# Patient Record
Sex: FEMALE | Race: BLACK OR AFRICAN AMERICAN | NOT HISPANIC OR LATINO | URBAN - METROPOLITAN AREA
[De-identification: names, ages, dates, MRNs, and addresses within clinical notes are randomized per-mention and may not be internally consistent; named-entity substitution may affect disease eponyms.]

---

## 2023-04-18 ENCOUNTER — INPATIENT (INPATIENT)
Facility: HOSPITAL | Age: 43
LOS: 10 days | Discharge: HOME | DRG: 55 | End: 2023-04-29
Attending: NEUROLOGICAL SURGERY | Admitting: NEUROLOGICAL SURGERY
Payer: MEDICARE

## 2023-04-18 VITALS
HEART RATE: 76 BPM | RESPIRATION RATE: 18 BRPM | DIASTOLIC BLOOD PRESSURE: 89 MMHG | SYSTOLIC BLOOD PRESSURE: 129 MMHG | OXYGEN SATURATION: 98 % | TEMPERATURE: 98 F

## 2023-04-18 DIAGNOSIS — M48.20 KISSING SPINE, SITE UNSPECIFIED: ICD-10-CM

## 2023-04-18 LAB
A1C WITH ESTIMATED AVERAGE GLUCOSE RESULT: 5.6 % — SIGNIFICANT CHANGE UP (ref 4–5.6)
ALBUMIN SERPL ELPH-MCNC: 3.7 G/DL — SIGNIFICANT CHANGE UP (ref 3.3–5)
ALP SERPL-CCNC: 44 U/L — SIGNIFICANT CHANGE UP (ref 40–120)
ALT FLD-CCNC: 15 U/L — SIGNIFICANT CHANGE UP (ref 10–45)
ANION GAP SERPL CALC-SCNC: 11 MMOL/L — SIGNIFICANT CHANGE UP (ref 5–17)
AST SERPL-CCNC: 6 U/L — LOW (ref 10–40)
BASOPHILS # BLD AUTO: 0.06 K/UL — SIGNIFICANT CHANGE UP (ref 0–0.2)
BASOPHILS NFR BLD AUTO: 0.5 % — SIGNIFICANT CHANGE UP (ref 0–2)
BILIRUB SERPL-MCNC: 0.2 MG/DL — SIGNIFICANT CHANGE UP (ref 0.2–1.2)
BUN SERPL-MCNC: 12 MG/DL — SIGNIFICANT CHANGE UP (ref 7–23)
CALCIUM SERPL-MCNC: 9.1 MG/DL — SIGNIFICANT CHANGE UP (ref 8.4–10.5)
CHLORIDE SERPL-SCNC: 103 MMOL/L — SIGNIFICANT CHANGE UP (ref 96–108)
CO2 SERPL-SCNC: 27 MMOL/L — SIGNIFICANT CHANGE UP (ref 22–31)
CREAT SERPL-MCNC: 0.53 MG/DL — SIGNIFICANT CHANGE UP (ref 0.5–1.3)
EGFR: 118 ML/MIN/1.73M2 — SIGNIFICANT CHANGE UP
EOSINOPHIL # BLD AUTO: 0.08 K/UL — SIGNIFICANT CHANGE UP (ref 0–0.5)
EOSINOPHIL NFR BLD AUTO: 0.6 % — SIGNIFICANT CHANGE UP (ref 0–6)
ESTIMATED AVERAGE GLUCOSE: 114 MG/DL — SIGNIFICANT CHANGE UP (ref 68–114)
GLUCOSE SERPL-MCNC: 112 MG/DL — HIGH (ref 70–99)
HCG SERPL-ACNC: <2 MIU/ML — SIGNIFICANT CHANGE UP
HCT VFR BLD CALC: 39 % — SIGNIFICANT CHANGE UP (ref 34.5–45)
HGB BLD-MCNC: 12.4 G/DL — SIGNIFICANT CHANGE UP (ref 11.5–15.5)
IMM GRANULOCYTES NFR BLD AUTO: 1.1 % — HIGH (ref 0–0.9)
LYMPHOCYTES # BLD AUTO: 1.97 K/UL — SIGNIFICANT CHANGE UP (ref 1–3.3)
LYMPHOCYTES # BLD AUTO: 15.9 % — SIGNIFICANT CHANGE UP (ref 13–44)
MAGNESIUM SERPL-MCNC: 1.9 MG/DL — SIGNIFICANT CHANGE UP (ref 1.6–2.6)
MCHC RBC-ENTMCNC: 28.7 PG — SIGNIFICANT CHANGE UP (ref 27–34)
MCHC RBC-ENTMCNC: 31.8 GM/DL — LOW (ref 32–36)
MCV RBC AUTO: 90.3 FL — SIGNIFICANT CHANGE UP (ref 80–100)
MONOCYTES # BLD AUTO: 0.63 K/UL — SIGNIFICANT CHANGE UP (ref 0–0.9)
MONOCYTES NFR BLD AUTO: 5.1 % — SIGNIFICANT CHANGE UP (ref 2–14)
NEUTROPHILS # BLD AUTO: 9.52 K/UL — HIGH (ref 1.8–7.4)
NEUTROPHILS NFR BLD AUTO: 76.8 % — SIGNIFICANT CHANGE UP (ref 43–77)
NRBC # BLD: 0 /100 WBCS — SIGNIFICANT CHANGE UP (ref 0–0)
PLATELET # BLD AUTO: 296 K/UL — SIGNIFICANT CHANGE UP (ref 150–400)
POTASSIUM SERPL-MCNC: 3.6 MMOL/L — SIGNIFICANT CHANGE UP (ref 3.5–5.3)
POTASSIUM SERPL-SCNC: 3.6 MMOL/L — SIGNIFICANT CHANGE UP (ref 3.5–5.3)
PROT SERPL-MCNC: 6.5 G/DL — SIGNIFICANT CHANGE UP (ref 6–8.3)
RBC # BLD: 4.32 M/UL — SIGNIFICANT CHANGE UP (ref 3.8–5.2)
RBC # FLD: 13.5 % — SIGNIFICANT CHANGE UP (ref 10.3–14.5)
SODIUM SERPL-SCNC: 141 MMOL/L — SIGNIFICANT CHANGE UP (ref 135–145)
T4 AB SER-ACNC: 6.4 UG/DL — SIGNIFICANT CHANGE UP (ref 4.6–12)
WBC # BLD: 12.4 K/UL — HIGH (ref 3.8–10.5)
WBC # FLD AUTO: 12.4 K/UL — HIGH (ref 3.8–10.5)

## 2023-04-18 PROCEDURE — 93970 EXTREMITY STUDY: CPT | Mod: 26

## 2023-04-18 RX ORDER — PANTOPRAZOLE SODIUM 20 MG/1
40 TABLET, DELAYED RELEASE ORAL
Refills: 0 | Status: DISCONTINUED | OUTPATIENT
Start: 2023-04-18 | End: 2023-04-29

## 2023-04-18 RX ORDER — ACETAMINOPHEN 500 MG
650 TABLET ORAL EVERY 6 HOURS
Refills: 0 | Status: DISCONTINUED | OUTPATIENT
Start: 2023-04-18 | End: 2023-04-29

## 2023-04-18 RX ORDER — NIFEDIPINE 30 MG
60 TABLET, EXTENDED RELEASE 24 HR ORAL DAILY
Refills: 0 | Status: DISCONTINUED | OUTPATIENT
Start: 2023-04-18 | End: 2023-04-26

## 2023-04-18 RX ORDER — OXYCODONE HYDROCHLORIDE 5 MG/1
10 TABLET ORAL EVERY 4 HOURS
Refills: 0 | Status: DISCONTINUED | OUTPATIENT
Start: 2023-04-18 | End: 2023-04-18

## 2023-04-18 RX ORDER — ALBUTEROL 90 UG/1
2 AEROSOL, METERED ORAL
Refills: 0 | DISCHARGE

## 2023-04-18 RX ORDER — POLYETHYLENE GLYCOL 3350 17 G/17G
17 POWDER, FOR SOLUTION ORAL DAILY
Refills: 0 | Status: DISCONTINUED | OUTPATIENT
Start: 2023-04-18 | End: 2023-04-20

## 2023-04-18 RX ORDER — SENNA PLUS 8.6 MG/1
2 TABLET ORAL AT BEDTIME
Refills: 0 | Status: DISCONTINUED | OUTPATIENT
Start: 2023-04-18 | End: 2023-04-29

## 2023-04-18 RX ORDER — ONDANSETRON 8 MG/1
4 TABLET, FILM COATED ORAL EVERY 6 HOURS
Refills: 0 | Status: DISCONTINUED | OUTPATIENT
Start: 2023-04-18 | End: 2023-04-20

## 2023-04-18 RX ORDER — OXYCODONE HYDROCHLORIDE 5 MG/1
5 TABLET ORAL EVERY 4 HOURS
Refills: 0 | Status: DISCONTINUED | OUTPATIENT
Start: 2023-04-18 | End: 2023-04-24

## 2023-04-18 RX ORDER — ALBUTEROL 90 UG/1
2 AEROSOL, METERED ORAL EVERY 6 HOURS
Refills: 0 | Status: DISCONTINUED | OUTPATIENT
Start: 2023-04-18 | End: 2023-04-29

## 2023-04-18 RX ORDER — METHOCARBAMOL 500 MG/1
500 TABLET, FILM COATED ORAL EVERY 8 HOURS
Refills: 0 | Status: DISCONTINUED | OUTPATIENT
Start: 2023-04-18 | End: 2023-04-29

## 2023-04-18 RX ORDER — NIFEDIPINE 30 MG
1 TABLET, EXTENDED RELEASE 24 HR ORAL
Refills: 0 | DISCHARGE

## 2023-04-18 RX ADMIN — SENNA PLUS 2 TABLET(S): 8.6 TABLET ORAL at 21:13

## 2023-04-18 RX ADMIN — Medication 650 MILLIGRAM(S): at 21:13

## 2023-04-18 RX ADMIN — Medication 650 MILLIGRAM(S): at 22:13

## 2023-04-18 NOTE — H&P ADULT - HISTORY OF PRESENT ILLNESS
42F Hx HTN, obesity s/p gastric sleeve, hysterectomy, xfer from [facility] for T11 lesion. OSH path report c/w leimyoma. MR showing svr stenosis @ level of T11 w/enhancing lesion with a high fat content. CT CAP was corrupted.  42F Hx HTN, obesity s/p gastric sleeve, hysterectomy, LBP since 08/2022 w/progressive BLE radic, 02/2023 the patient began having difficulty ambulating 2/2 pain, was started on steroids and sent to rehab->worsening pain, re-scanned w/new T11 lesion s/p biopsy  xfer from [facility] for T11 lesion. OSH path report c/w leimyoma. MR showing svr stenosis @ level of T11 w/enhancing lesion with a high fat content. CT CAP was corrupted.

## 2023-04-18 NOTE — H&P ADULT - NSHPPHYSICALEXAM_GEN_ALL_CORE
AOx3, BUE 5s throughout, BLE 5s throughout, neg rowan/neg clonus b/l, else normoreflexive, paresthesias to feet going up to ankles

## 2023-04-18 NOTE — H&P ADULT - ASSESSMENT
42F Hx HTN, obesity s/p gastric sleeve, hysterectomy, xfer from [facility] for T11 lesion. OSH path report c/w leimyoma. MR showing svr stenosis @ level of T11 w/enhancing lesion with a high fat content. CT CAP was corrupted.   Exam: Intact outside of BLE stocking paresthesias  -CT CAP w/iv con, CT C/T/L spine reformat with thin cuts  -obtain pathology slides  -Hospitalist to comanage/clear for eventual OR 42F Hx HTN, obesity s/p gastric sleeve, hysterectomy, LBP since 08/2022 w/progressive BLE radic, 02/2023 the patient began having difficulty ambulating 2/2 pain, was started on steroids and sent to rehab->worsening pain, re-scanned w/new T11 lesion s/p biopsy  xfer from [facility] for T11 lesion. OSH path report c/w leimyoma. MR showing svr stenosis @ level of T11 w/enhancing lesion with a high fat content. CT CAP was corrupted.   Exam: Intact outside of BLE stocking paresthesias  -CT CAP w/iv con, CT C/T/L spine reformat with thin cuts  -obtain pathology slides  -Hospitalist to comanage/clear for eventual OR

## 2023-04-19 DIAGNOSIS — D49.2 NEOPLASM OF UNSPECIFIED BEHAVIOR OF BONE, SOFT TISSUE, AND SKIN: ICD-10-CM

## 2023-04-19 DIAGNOSIS — Z29.9 ENCOUNTER FOR PROPHYLACTIC MEASURES, UNSPECIFIED: ICD-10-CM

## 2023-04-19 DIAGNOSIS — I10 ESSENTIAL (PRIMARY) HYPERTENSION: ICD-10-CM

## 2023-04-19 DIAGNOSIS — Z01.818 ENCOUNTER FOR OTHER PREPROCEDURAL EXAMINATION: ICD-10-CM

## 2023-04-19 LAB
APTT BLD: 29 SEC — SIGNIFICANT CHANGE UP (ref 27.5–35.5)
BLD GP AB SCN SERPL QL: NEGATIVE — SIGNIFICANT CHANGE UP
INR BLD: 0.97 RATIO — SIGNIFICANT CHANGE UP (ref 0.88–1.16)
MRSA PCR RESULT.: SIGNIFICANT CHANGE UP
PHOSPHATE SERPL-MCNC: 5.7 MG/DL — HIGH (ref 2.5–4.5)
PROTHROM AB SERPL-ACNC: 11.2 SEC — SIGNIFICANT CHANGE UP (ref 10.5–13.4)
RH IG SCN BLD-IMP: POSITIVE — SIGNIFICANT CHANGE UP
S AUREUS DNA NOSE QL NAA+PROBE: SIGNIFICANT CHANGE UP

## 2023-04-19 PROCEDURE — 72128 CT CHEST SPINE W/O DYE: CPT | Mod: 26

## 2023-04-19 PROCEDURE — 72131 CT LUMBAR SPINE W/O DYE: CPT | Mod: 26

## 2023-04-19 PROCEDURE — 93010 ELECTROCARDIOGRAM REPORT: CPT | Mod: 76

## 2023-04-19 PROCEDURE — 74177 CT ABD & PELVIS W/CONTRAST: CPT | Mod: 26

## 2023-04-19 PROCEDURE — 99232 SBSQ HOSP IP/OBS MODERATE 35: CPT

## 2023-04-19 PROCEDURE — 72125 CT NECK SPINE W/O DYE: CPT | Mod: 26

## 2023-04-19 PROCEDURE — 71260 CT THORAX DX C+: CPT | Mod: 26

## 2023-04-19 PROCEDURE — 99223 1ST HOSP IP/OBS HIGH 75: CPT

## 2023-04-19 RX ORDER — ENOXAPARIN SODIUM 100 MG/ML
40 INJECTION SUBCUTANEOUS
Refills: 0 | Status: DISCONTINUED | OUTPATIENT
Start: 2023-04-19 | End: 2023-04-29

## 2023-04-19 RX ADMIN — Medication 5 MILLIGRAM(S): at 21:47

## 2023-04-19 RX ADMIN — SENNA PLUS 2 TABLET(S): 8.6 TABLET ORAL at 21:47

## 2023-04-19 RX ADMIN — Medication 650 MILLIGRAM(S): at 05:13

## 2023-04-19 RX ADMIN — PANTOPRAZOLE SODIUM 40 MILLIGRAM(S): 20 TABLET, DELAYED RELEASE ORAL at 05:12

## 2023-04-19 RX ADMIN — Medication 1 TABLET(S): at 12:06

## 2023-04-19 RX ADMIN — Medication 60 MILLIGRAM(S): at 05:12

## 2023-04-19 RX ADMIN — Medication 650 MILLIGRAM(S): at 06:13

## 2023-04-19 RX ADMIN — ENOXAPARIN SODIUM 40 MILLIGRAM(S): 100 INJECTION SUBCUTANEOUS at 17:32

## 2023-04-19 NOTE — CONSULT NOTE ADULT - SUBJECTIVE AND OBJECTIVE BOX
NEUROSURGERY - [facility] MEDICINE CO-MANAGEMENT INITIAL VISIT NOTE    CHIEF COMPLAINT: Patient is a 42y old  Female who presents with a chief complaint of T11 bony tumor (19 Apr 2023 13:46)      HPI: 42F Hx HTN, obesity s/p gastric sleeve, hysterectomy, LBP since 08/2022 w/progressive BLE radic, 02/2023 the patient began having difficulty ambulating 2/2 pain, was started on steroids and sent to rehab->worsening pain, re-scanned w/new T11 lesion s/p biopsy  xfer from [facility] for T11 lesion. OSH path report c/w leimyoma. MR showing svr stenosis @ level of T11 w/enhancing lesion with a high fat content.     Metastatic work up with wall thickening concern for esophageal mass, right hepatic lobe lesion measures 1.4 cm &  Bilateral adrenal nodules measuring 1.8 cm on the left and 1.1 cm on the right.     Pt currently in bed, c/o LE weakness, denies pain.       Allergies    No Known Allergies      Home Medications:  Albuterol (Eqv-Proventil HFA) 90 mcg/inh inhalation aerosol: 2 puff(s) inhaled every 6 hours as needed for  shortness of breath and/or wheezing (18 Apr 2023 17:38)  NIFEdipine 60 mg oral tablet, extended release: 1 tab(s) orally once a day (18 Apr 2023 17:39)      MEDICATIONS  (STANDING):  enoxaparin Injectable 40 milliGRAM(s) SubCutaneous <User Schedule>  multivitamin 1 Tablet(s) Oral daily  NIFEdipine XL 60 milliGRAM(s) Oral daily  pantoprazole    Tablet 40 milliGRAM(s) Oral before breakfast  polyethylene glycol 3350 17 Gram(s) Oral daily  senna 2 Tablet(s) Oral at bedtime    MEDICATIONS  (PRN):  acetaminophen     Tablet .. 650 milliGRAM(s) Oral every 6 hours PRN Temp greater or equal to 38C (100.4F), Mild Pain (1 - 3)  albuterol    90 MICROgram(s) HFA Inhaler 2 Puff(s) Inhalation every 6 hours PRN for shortness of breath and/or wheezing  bisacodyl 5 milliGRAM(s) Oral daily PRN Constipation  methocarbamol 500 milliGRAM(s) Oral every 8 hours PRN Muscle Spasm  ondansetron Injectable 4 milliGRAM(s) IV Push every 6 hours PRN Nausea and/or Vomiting  oxyCODONE    IR 5 milliGRAM(s) Oral every 4 hours PRN Moderate Pain (4 - 6)  oxyCODONE    IR 10 milliGRAM(s) Oral every 4 hours PRN Severe Pain (7 - 10)      PAST MEDICAL & SURGICAL HISTORY:  [ x] Reviewed- recently diagnosed htn    Functional Assessment: [ ] Independent  [x ] Assistance  [ ] Total care  [ ] Non-ambulatory    SOCIAL HISTORY:  Residence: [ ] custodial  [ ] SNF  [ x] Community  [x ] Substance abuse: None    FAMILY HISTORY:  [ x] No pertinent family history in first degree relatives of spinal mass    REVIEW OF SYSTEMS:    CONSTITUTIONAL: No fever, weight loss, or fatigue  EYES: No eye pain, visual disturbances, or discharge  ENMT:  No difficulty hearing, tinnitus, vertigo; No sinus or throat pain  NECK: No pain or stiffness  BREASTS: No pain, masses, or nipple discharge  RESPIRATORY: No cough, wheezing, chills or hemoptysis; No shortness of breath  CARDIOVASCULAR: No chest pain, palpitations, dizziness, or leg swelling  GASTROINTESTINAL: No abdominal or epigastric pain. No nausea, vomiting, or hematemesis; No diarrhea or constipation. No melena or hematochezia.  GENITOURINARY: No dysuria, frequency, hematuria, or incontinence  NEUROLOGICAL: No headaches, memory loss. LE weakness  SKIN: No itching, burning, rashes, or lesions   LYMPH NODES: No enlarged glands  ENDOCRINE: No heat or cold intolerance; No hair loss  MUSCULOSKELETAL: Back pain  PSYCHIATRIC: No depression, anxiety, mood swings, or difficulty sleeping  HEME/LYMPH: No easy bruising, or bleeding gums  ALLERGY AND IMMUNOLOGIC: No hives or eczema    [ x ] All other ROS negative  [  ] Unable to obtain due to poor mental status    PHYSICAL EXAM:    Vital Signs Last 24 Hrs  T(C): 36.6 (19 Apr 2023 13:28), Max: 36.6 (18 Apr 2023 21:14)  T(F): 97.9 (19 Apr 2023 13:28), Max: 97.9 (19 Apr 2023 13:28)  HR: 72 (19 Apr 2023 13:28) (72 - 84)  BP: 106/74 (19 Apr 2023 13:28) (106/74 - 131/81)  BP(mean): --  RR: 18 (19 Apr 2023 13:28) (18 - 18)  SpO2: 98% (19 Apr 2023 13:28) (98% - 100%)    Parameters below as of 19 Apr 2023 13:28  Patient On (Oxygen Delivery Method): room air        CONSTITUTIONAL: Well-groomed, in no apparent distress  EYES: No conjunctival or scleral injection, non-icteric; PERRLA and symmetric  ENMT: No external nasal lesions; nasal mucosa not inflamed; normal dentition; no pharyngeal injection or exudates, oral mucosa with moist membranes  NECK: Trachea midline without palpable neck mass; thyroid not enlarged and non-tender  RESPIRATORY: Breathing comfortably; no dullness to percussion; lungs CTA without wheeze/rhonchi/rales  CARDIOVASCULAR: +S1S2, RRR, no M/G/R; no carotid bruits; pedal pulses full and symmetric; no lower extremity edema  CHEST/BREAST: Breasts are symmetric in appearance; no palpable masses or lumps  GASTROINTESTINAL: No palpable masses or tenderness, +BS throughout, no rebound/guarding; no hepatosplenomegaly; no hernia palpated  LYMPHATIC: No cervical LAD or tenderness; no axillary LAD or tenderness; no inguinal LAD or tenderness  MUSCULOSKELETAL: No digital clubbing or cyanosis; no paraspinal tenderness; examination of the (head/neck, spine/ribs/pelvis, RUE, LUE, RLE, LLE) without misalignment  SKIN: No rashes or ulcers noted; no subcutaneous nodules or induration palpable  NEUROLOGIC: LE weakness- 2-3/5  PSYCHIATRIC: A+O x 3; mood and affect appropriate; appropriate insight and judgment    LABS:                        12.4   12.40 )-----------( 296      ( 18 Apr 2023 15:43 )             39.0     Hemoglobin: 12.4 g/dL (04-18 @ 15:43)    04-18    141  |  103  |  12  ----------------------------<  112<H>  3.6   |  27  |  0.53    Ca    9.1      18 Apr 2023 15:42  Phos  5.7     04-19  Mg     1.9     04-18    TPro  6.5  /  Alb  3.7  /  TBili  0.2  /  DBili  x   /  AST  6<L>  /  ALT  15  /  AlkPhos  44  04-18    PT/INR - ( 19 Apr 2023 06:19 )   PT: 11.2 sec;   INR: 0.97 ratio         PTT - ( 19 Apr 2023 06:19 )  PTT:29.0 sec    CAPILLARY BLOOD GLUCOSE            RADIOLOGY & ADDITIONAL STUDIES:    EKG:   Personally Reviewed:  [ ] YES     Imaging:   Personally Reviewed:  [ ] YES               [ ] Consultant(s) Notes Reviewed  [x] Care Discussed with Consultants/Other Providers: Neurosurgery Team    [ x] Fall risks identified:      [ x] Increased delirium risk    [x ] Delirium and other risks can be reduced by:          -early ambulation          -minimizing "tethers" - IV, oxygen, catheters, etc          -avoiding hypnotics and sedatives          -maintaining hydration/nutrition          -avoid anticholinergics - diphenhydramine, etc          -pain control          -supportive environment    Advanced Directives: [ ] DNR  [ ] No feeding tube  [ ] MOLST in chart  [ ] MOLST completed today  [x ] Unknown

## 2023-04-19 NOTE — CONSULT NOTE ADULT - ASSESSMENT
42F Hx HTN, obesity s/p gastric sleeve, hysterectomy, LBP since 08/2022 w/progressive BLE radic, 02/2023 the patient began having difficulty ambulating 2/2 pain, was started on steroids and sent to rehab->worsening pain, re-scanned w/new T11 lesion s/p biopsy  xfer from [facility] for T11 lesion. OSH path report c/w leimyoma. MR showing svr stenosis @ level of T11 w/enhancing lesion with a high fat content.    Metastatic work up with wall thickening concern for esophageal mass, right hepatic lobe lesion measures 1.4 cm &  Bilateral adrenal nodules measuring 1.8 cm on the left and 1.1 cm on the right.     Hx Gastric sleeve 3 years ago - underwent pre-op EGD (reported negative per pt)  +reflux/dyspepsia - Rx with PPI by outpt GI/surgeon (Jayna) per pt report    #mid-esophageal wall thickening ?mass ?stricture ?esophagitis  -Reviewed with pt indications, risks, benefits of EGD to evaluate CT findings and offered EGD for endoscopic evaluation and biopsy. She is refusing any GI procedure and further biopsies.  -Clinically tolerating PO, can continue. Monitor PO tolerance  -continue PPI (known GERD and s/p gastric sleeve anatomy, recent steroids)  -emotional support given; consider psych input    Further management per primary team  Discussed with Neurosurgery    Bulmaro Bragg PA-C    [facility]  [phone]  Available on TEAMS Mon-Fri 8a-4p  After hours and weekend coverage [phone]

## 2023-04-19 NOTE — CONSULT NOTE ADULT - PROBLEM SELECTOR RECOMMENDATION 2
Potential plan for surgery. Pt has low cardiac risk predictors and is medically optimized.     Obtain baseline EKG.

## 2023-04-19 NOTE — CONSULT NOTE ADULT - ASSESSMENT
42F Hx HTN, obesity s/p gastric sleeve, hysterectomy, LBP since 08/2022 w/progressive BLE radic, 02/2023 the patient began having difficulty ambulating 2/2 pain, was started on steroids and sent to rehab->worsening pain, re-scanned w/new T11 lesion s/p biopsy- xfer from [facility] for T11 lesion. OSH path report c/w leimyoma. MR showing svr stenosis @ level of T11 w/enhancing lesion with a high fat content.     Metastatic work up with wall thickening concern for esophageal mass, right hepatic lobe lesion measures 1.4 cm &  Bilateral adrenal nodules measuring 1.8 cm on the left and 1.1 cm on the right.     Pt currently in bed, c/o LE weakness, denies pain.

## 2023-04-19 NOTE — CONSULT NOTE ADULT - PROBLEM SELECTOR RECOMMENDATION 3
Per pt, no prior history. Was recently diagnosed during [facility] admission.     Continue Procardia XL.

## 2023-04-19 NOTE — PROGRESS NOTE ADULT - ASSESSMENT
42F Hx HTN, obesity s/p gastric sleeve, hysterectomy, LBP since 08/2022 w/progressive BLE radic, 02/2023 the patient began having difficulty ambulating 2/2 pain, was started on steroids and sent to rehab->worsening pain, re-scanned w/new T11 lesion s/p biopsy  xfer from [facility] for T11 lesion. OSH path report c/w leimyoma. MR showing svr stenosis @ level of T11 w/enhancing lesion with a high fat content. Metastatic work up with wall thickening concern for esophageal mass, right hepatic lobe lesion measures 1.4 cm &  Bilateral adrenal nodules measuring 1.8 cm on the left and 1.1 cm on the right    Plan     Neuro stable. Slides T11 lesion biopsy from [facility] being transferred   Vitals stable  Medicine consulted for clearance  GI consulted for possible esophageal mass   DVT ppx   D/w Dr Anaya

## 2023-04-19 NOTE — PROGRESS NOTE ADULT - SUBJECTIVE AND OBJECTIVE BOX
SUBJECTIVE:   c/o pins & needles b/l bottom of feet . No bowel or bladder issues   OVERNIGHT EVENTS: none    Vital Signs Last 24 Hrs  T(C): 36.6 (2023 13:28), Max: 36.8 (2023 15:48)  T(F): 97.9 (2023 13:28), Max: 98.2 (2023 15:48)  HR: 72 (:28) (72 - 87)  BP: 106/74 (:28) (106/74 - 131/81)  BP(mean): --  RR: 18 (2023 13:28) (18 - 18)  SpO2: 98% (:28) (98% - 100%)    Parameters below as of 2023 13:  Patient On (Oxygen Delivery Method): room air        PHYSICAL EXAM:    Constitutional: No Acute Distress     Neurological: Awake alert Ox3, Speech clear Following Commands, Moving all Extremities 5/5 B/L LE below knee decreased sensation light touch .     Pulmonary: Clear to Auscultation,    Cardiovascular: S1, S2, Regular rate and rhythm     Gastrointestinal: Soft, Non-tender, Non-distended     Extremities: No calf tenderness       LABS:                        12.4   12.40 )-----------( 296      ( 2023 15:43 )             39.0    04-18    141  |  103  |  12  ----------------------------<  112<H>  3.6   |  27  |  0.53    Ca    9.1      2023 15:42  Phos  5.7     -  Mg     1.9     -18    TPro  6.5  /  Alb  3.7  /  TBili  0.2  /  DBili  x   /  AST  6<L>  /  ALT  15  /  AlkPhos  44  04-18  PT/INR - ( 2023 06:19 )   PT: 11.2 sec;   INR: 0.97 ratio   PTT:29.0 sec          IMAGIN/19 Ct T/L spine- Markedly coarsened osseous texture of the entire T11 vertebral body that extends into the bilateral pedicles. There is soft tissue filling the left neural foramen at T11/T12 and to a lesser degree within the left T10/T11 neural foramen     Wall thickening of the mid esophagus, concerning for underlying mass. Consider further evaluation with endoscopy.  * Indeterminate lesions in the right hepatic lobe and bilateral adrenal glands, which may be further evaluated with contrast-enhanced abdominal MRI.  * Subcentimeter nodules in the left lung.  * Lytic lesion in the T11 vertebral body    MEDICATIONS:    acetaminophen     Tablet .. 650 milliGRAM(s) Oral every 6 hours PRN Temp greater or equal to 38C (100.4F), Mild Pain (1 - 3)  methocarbamol 500 milliGRAM(s) Oral every 8 hours PRN Muscle Spasm  ondansetron Injectable 4 milliGRAM(s) IV Push every 6 hours PRN Nausea and/or Vomiting  oxyCODONE    IR 5 milliGRAM(s) Oral every 4 hours PRN Moderate Pain (4 - 6)  oxyCODONE    IR 10 milliGRAM(s) Oral every 4 hours PRN Severe Pain (7 - 10)  NIFEdipine XL 60 milliGRAM(s) Oral daily  albuterol    90 MICROgram(s) HFA Inhaler 2 Puff(s) Inhalation every 6 hours PRN for shortness of breath and/or wheezing  bisacodyl 5 milliGRAM(s) Oral daily PRN Constipation  pantoprazole    Tablet 40 milliGRAM(s) Oral before breakfast  polyethylene glycol 3350 17 Gram(s) Oral daily  senna 2 Tablet(s) Oral at bedtime  enoxaparin Injectable 40 milliGRAM(s) SubCutaneous <User Schedule>  multivitamin 1 Tablet(s) Oral daily      DIET:

## 2023-04-19 NOTE — CONSULT NOTE ADULT - PROBLEM SELECTOR RECOMMENDATION 4
Lovenox for DVT prophylaxis.       Has no PMD. Lovenox for DVT prophylaxis.       H/o asthma- no current exbn. Prn Albuterol.     Has no PMD.

## 2023-04-19 NOTE — CONSULT NOTE ADULT - SUBJECTIVE AND OBJECTIVE BOX
Patient is a 42y old  Female who presents with a chief complaint of T11 bony tumor (19 Apr 2023 16:06)      HPI:  42F Hx HTN, obesity s/p gastric sleeve, hysterectomy, LBP since 08/2022 w/progressive BLE radic, 02/2023 the patient began having difficulty ambulating 2/2 pain, was started on steroids and sent to rehab->worsening pain, re-scanned w/new T11 lesion s/p biopsy  xfer from [facility] for T11 lesion. OSH path report c/w leimyoma. MR showing svr stenosis @ level of T11 w/enhancing lesion with a high fat content.    c/o lower extremity weakness  no back pain    recent hospitalizations and rehab stays 2/2 LBP.Initial onset of back pain Summer 2022; Rx with analgesics (narcotics, NSAIDs and muscle relaxants). Developed weakness and inability to walk several months ago; treated with steroids. s/p multiple scans and biopsies at [facility] - then transferred to [facility] for further neurosurgical management.   Metastatic work up with wall thickening concern for esophageal mass, right hepatic lobe lesion measures 1.4 cm &  Bilateral adrenal nodules measuring 1.8 cm on the left and 1.1 cm on the right.       Hx Gastric sleeve 3 years ago - underwent pre-op EGD (reported negative per pt)  +reflux/dyspepsia - Rx with PPI by outpt GI/surgeon (Jayna)  BM today - brown formed stool  no abdominal pain, nausea or vomiting  reports sensation of odynophagia "only when eating too fast"; otherwise tolerating PO diet without issues  no regurgitation or emesis    pt very tearful and expressing frustration about having to undergo multiple biopsies and procedures.       PAST MEDICAL & SURGICAL HISTORY:  hx fibroids; s/p myomectomy, s/p emergent hysterectomy 2/2 bleeding  s/p BTL  s/p gastric sleeve 3 years ago ([facility])  HTN  Obesity  LBP, sciatica    Allergies  No Known Allergies      MEDICATIONS  (STANDING):  enoxaparin Injectable 40 milliGRAM(s) SubCutaneous <User Schedule>  multivitamin 1 Tablet(s) Oral daily  NIFEdipine XL 60 milliGRAM(s) Oral daily  pantoprazole    Tablet 40 milliGRAM(s) Oral before breakfast  polyethylene glycol 3350 17 Gram(s) Oral daily  senna 2 Tablet(s) Oral at bedtime    MEDICATIONS  (PRN):  acetaminophen     Tablet .. 650 milliGRAM(s) Oral every 6 hours PRN Temp greater or equal to 38C (100.4F), Mild Pain (1 - 3)  albuterol    90 MICROgram(s) HFA Inhaler 2 Puff(s) Inhalation every 6 hours PRN for shortness of breath and/or wheezing  bisacodyl 5 milliGRAM(s) Oral daily PRN Constipation  methocarbamol 500 milliGRAM(s) Oral every 8 hours PRN Muscle Spasm  ondansetron Injectable 4 milliGRAM(s) IV Push every 6 hours PRN Nausea and/or Vomiting  oxyCODONE    IR 5 milliGRAM(s) Oral every 4 hours PRN Moderate Pain (4 - 6)  oxyCODONE    IR 10 milliGRAM(s) Oral every 4 hours PRN Severe Pain (7 - 10)      Social History:    no tobacco  2 children ages 13, 14  small business owner (restaurant)    Family History   IBD (  ) Yes   ( X ) No  GI Malignancy (  )  Yes    ( X ) No      Advanced Directives: (   X  ) None    (      ) DNR    (     ) DNI    (     ) Health Care Proxy:     Review of Systems:  see HPI- remainder 10 point ROS negative      Vital Signs Last 24 Hrs  T(C): 36.9 (19 Apr 2023 15:52), Max: 36.9 (19 Apr 2023 15:52)  T(F): 98.4 (19 Apr 2023 15:52), Max: 98.4 (19 Apr 2023 15:52)  HR: 88 (19 Apr 2023 15:52) (72 - 88)  BP: 112/77 (19 Apr 2023 15:52) (106/74 - 131/81)  BP(mean): --  RR: 18 (19 Apr 2023 15:52) (18 - 18)  SpO2: 98% (19 Apr 2023 15:52) (98% - 100%)    Parameters below as of 19 Apr 2023 15:52  Patient On (Oxygen Delivery Method): room air    PHYSICAL EXAM:    Constitutional: NAD, well-developed non toxic appearing.  intermittently tearful.   Neck: No LAD, supple no JVD  Mouth: no thrush no lesions  Respiratory: bl air entry, no accessory muscle use  Cardiovascular: S1 and S2, RRR  Gastrointestinal: BS+, soft, obese soft ND NT  WH surgical scars   Extremities: No peripheral edema, neg clubing, cyanosis  Vascular: 2+ peripheral pulses  Neurological: A/O x 3, no focal asymmetry  b/l LE weakness 3/5  Psychiatric: tearful, good eye contact. expressing fear and frustration  Skin: No rashes    LABS:                        12.4   12.40 )-----------( 296      ( 18 Apr 2023 15:43 )             39.0     04-18    141  |  103  |  12  ----------------------------<  112<H>  3.6   |  27  |  0.53    Ca    9.1      18 Apr 2023 15:42  Phos  5.7     04-19  Mg     1.9     04-18    TPro  6.5  /  Alb  3.7  /  TBili  0.2  /  DBili  x   /  AST  6<L>  /  ALT  15  /  AlkPhos  44  04-18    PT/INR - ( 19 Apr 2023 06:19 )   PT: 11.2 sec;   INR: 0.97 ratio         PTT - ( 19 Apr 2023 06:19 )  PTT:29.0 sec        RADIOLOGY & ADDITIONAL TESTS:    ACC: [id number] EXAM:  CT CHEST IC   ORDERED BY:  EDUAR FITZGERALD     ACC: [id number] EXAM:  CT ABDOMEN AND PELVIS IC   ORDERED BY:  EDUAR FITZGERALD     PROCEDURE DATE:  04/19/2023          INTERPRETATION:  CLINICAL INFORMATION: Evaluate for metastatic disease.   T11 bone tumor.    COMPARISON: None.    CONTRAST/COMPLICATIONS:  IV Contrast: Omnipaque 350  90 cc administered   10 cc discarded  Oral Contrast: NONE  Complications: None reported at time of study completion    PROCEDURE:  CT of the Chest, Abdomen and Pelvis was performed.  Sagittal and coronal reformats were performed.    FINDINGS:  CHEST:  LUNGS AND LARGE AIRWAYS: Patent central airways. Nonspecific groundglass   nodules measuring 5 mm in the left upper lobe and 4 mm in the left lower   lobe (3:65 and 82)  PLEURA: No pleural effusion.  VESSELS: Within normal limits.  HEART: Heart size is normal. No pericardial effusion.  MEDIASTINUM AND MERVAT: Fluid distends the proximal esophagus. Wall   thickening of the midesophagus, raising a question of underlying mass.   Small hiatal hernia.  CHEST WALL AND LOWER NECK: Within normal limits.    ABDOMEN AND PELVIS:  LIVER: Indeterminate lesion in the right hepatic lobe measures 1.4 cm.  BILE DUCTS: Normal caliber.  GALLBLADDER: Sludge and stones.  SPLEEN: Within normal limits.  PANCREAS: Within normal limits.  ADRENALS: Bilateral adrenal nodules measuring 1.8 cm on the left and 1.1   cm on the right.  KIDNEYS/URETERS: Within normal limits.    BLADDER: Within normal limits.  REPRODUCTIVE ORGANS: Hysterectomy.    BOWEL: Sleeve gastrectomy. No bowel obstruction. Appendix is normal.  PERITONEUM: No ascites.  VESSELS: Within normal limits.  RETROPERITONEUM/LYMPH NODES: No lymphadenopathy.  ABDOMINAL WALL: Within normal limits.  BONES: Lytic lesion in the T11 vertebral body, in keeping with the   clinical history.    IMPRESSION:  *  Wall thickening of the mid esophagus, concerning for underlying mass.   Consider further evaluation with endoscopy.  *  Indeterminate lesions in the right hepatic lobe and bilateral adrenal   glands, which may be further evaluated with contrast-enhanced abdominal   MRI.  *  Subcentimeter nodules in the left lung.  *  Lytic lesion in the T11 vertebral body corresponding with lesion   described by clinical history.      ACC: [id number] EXAM:  CT THORACIC SPINE   ORDERED BY:  EDUAR FITZGERALD   ACC: [id number] EXAM:  CT LUMBAR SPINE   ORDERED BY:  EDUAR FITZGERALD   ACC: [id number] EXAM:  CT CERVICAL SPINE   ORDERED BY:  EDUAR FITZGERALD     PROCEDURE DATE:  04/19/2023      INTERPRETATION:  Exam Date: 4/19/2023 11:49 AM    CT cervical, thoracic and lumbar spine without IV contrast    CLINICAL INFORMATION: Staging for possible metastatic disease. Preop   planning.    TECHNIQUE:  Contiguous axial  sections were obtained through the   cervical, thoracic and lumbar spine using a single helical acquisition.     Coronal and sagital reformats were obtained.    FINDINGS:   No prior similar studies are available for review    Cervical, thoracic, lumbar spine are reported together.    There is markedly coarsened osseous texture of the entire T11 vertebral   body that extends into the bilateral pedicles. There is soft tissue   filling the left neural foramen at T11/T12 and to a lesser degree within   the left T10/T11 neuralforamen, unknown if this is part of the same bony   process versus disc herniations, limited by CT technique. No associated   fracture.    At L5/S1, there is disc herniation within the left neural foramen   narrowing the left neural foramen. No significant right foraminal or   central spinal canal stenosis.    Rest of the cervical, thoracic, lumbar intervertebral discs appear   unremarkable, however evaluation of the spinal canal is very limited at   C6 level through T1 secondary to patient body habitus.    No paraspinal mass is recognized.  Paraspinal soft tissues appear intact.      IMPRESSION:    Markedly coarsened osseous texture of the entire T11 vertebral body that   extends into the bilateral pedicles. There is soft tissue filling the   left neural foramen at T11/T12 and to a lesser degree within the left   T10/T11 neural foramen, unknown if this is part of the same bony process   versus disc herniations, limited by CT technique. MRI may be helpful for   further evaluation as clinically indicated.    No additional suspicious lesions of the cervical, thoracic, lumbar spine.

## 2023-04-19 NOTE — PATIENT PROFILE ADULT - FALL HARM RISK - HARM RISK INTERVENTIONS

## 2023-04-20 DIAGNOSIS — M89.9 DISORDER OF BONE, UNSPECIFIED: ICD-10-CM

## 2023-04-20 PROCEDURE — 99231 SBSQ HOSP IP/OBS SF/LOW 25: CPT

## 2023-04-20 PROCEDURE — 99233 SBSQ HOSP IP/OBS HIGH 50: CPT | Mod: GC

## 2023-04-20 PROCEDURE — 99232 SBSQ HOSP IP/OBS MODERATE 35: CPT

## 2023-04-20 PROCEDURE — 99221 1ST HOSP IP/OBS SF/LOW 40: CPT | Mod: 57

## 2023-04-20 RX ORDER — POLYETHYLENE GLYCOL 3350 17 G/17G
17 POWDER, FOR SOLUTION ORAL DAILY
Refills: 0 | Status: DISCONTINUED | OUTPATIENT
Start: 2023-04-20 | End: 2023-04-26

## 2023-04-20 RX ADMIN — Medication 1 TABLET(S): at 11:47

## 2023-04-20 RX ADMIN — Medication 650 MILLIGRAM(S): at 21:50

## 2023-04-20 RX ADMIN — Medication 60 MILLIGRAM(S): at 05:38

## 2023-04-20 RX ADMIN — SENNA PLUS 2 TABLET(S): 8.6 TABLET ORAL at 21:51

## 2023-04-20 RX ADMIN — Medication 650 MILLIGRAM(S): at 22:20

## 2023-04-20 RX ADMIN — PANTOPRAZOLE SODIUM 40 MILLIGRAM(S): 20 TABLET, DELAYED RELEASE ORAL at 05:38

## 2023-04-20 RX ADMIN — METHOCARBAMOL 500 MILLIGRAM(S): 500 TABLET, FILM COATED ORAL at 21:52

## 2023-04-20 RX ADMIN — Medication 650 MILLIGRAM(S): at 16:15

## 2023-04-20 RX ADMIN — POLYETHYLENE GLYCOL 3350 17 GRAM(S): 17 POWDER, FOR SOLUTION ORAL at 11:47

## 2023-04-20 RX ADMIN — Medication 650 MILLIGRAM(S): at 17:15

## 2023-04-20 NOTE — CONSULT NOTE ADULT - ATTENDING COMMENTS
42F Hx HTN, obesity s/p gastric sleeve, hysterectomy, LBP since 08/2022 w/progressive BLE radic, 02/2023 the patient began having difficulty ambulating 2/2 pain, was started on steroids and sent to rehab->worsening pain, re-scanned w/new T11 lesion s/p biopsy transferred from [facility] for T11 lesion.    #Thoracic Vertebral Lesion  Patient has had progressively worsening lower back pain since August 2022. Initially presented as lower back pain but developed bilateral lower extremity radiculopathy and difficulty ambulating. She was recently found to have a T11 vertebral lesion that was biopsied at [facility]. That biopsy reportedly showed leiomyoma.  - Slides have been requested from [facility] to be reviewed by a pathologist here.  - CT chest/abd/pelvis 4/19/23 shows wall thickening of the mid esophagus, concerning for underlying mass. Indeterminate lesions in the right hepatic lobe and bilateral adrenal glands, which may be further evaluated with contrast-enhanced abdominal MRI. Subcentimeter nodules in the left lung. Lytic lesion in the T11 vertebral body corresponding with lesion described by clinical history.  - Would prefer that patient have EGD with biopsy of esophageal thickening noted, but patient declining at this time as she has already had two biopsies and we are still waiting for the slides to be delivered and reviewed. Those two biopsies were of the primary site, so still would need to know if there are any metastatic lesions.  - Neurosurgery planning for spinal procedure and will obtain tissue  - Recommend MRI abdomen w/ IV contrast to further evaluate the liver  - Outpatient oncologist Dr. Marito Akbar had been contacted about her case. Will continue to keep Dr. Akbar updated regarding patient's hospital course

## 2023-04-20 NOTE — PHYSICAL THERAPY INITIAL EVALUATION ADULT - ADDITIONAL COMMENTS
as per pt: PTA pt was living in a PH + Stairs lives in [place] on 2nd floor walk up/ and was independent in all functional mobility and ADL's. RW for gait since last rehab stay. however pt states she is going to stay with sisters home upon DC at her home in [facility] 1st floor set up and family can assist her as needed.

## 2023-04-20 NOTE — PROGRESS NOTE ADULT - PROBLEM SELECTOR PLAN 1
Pt w morbid obesity with a BMI ~45, multiple prior fibroids requiring extensive gynecological procedures culminating in hysterectomy, p/w increasing LBP with radiation down legs and difficulty ambulating. Started last summer in 8/2022. Increased in 2/2023 requiring hospital admission. Work-up revealed an intraosseous mass at T11 with epidural extension and high-grade cord compression. Reportedly also had some lesions in the lumbar spine as well. Lumbar spine lesion biopsied, c/w leiomyoma. T11 lesion also biopsied as well which was also leiomyoma. Was offered surgery but transferred over to NS due to concern about possible underlying malignancy.     MRI T-spine w/wo shows T11 lesion that appears to have significant epidural extension with cord compression. Metastatic workup as above. Pt does not want an EGD this time for evaluation of the esophageal findings.     Awaiting slides from [facility] for surgical planning. Per Onc- Recommend MRI abdomen w/ IV contrast to further evaluate the liver Pt w morbid obesity with a BMI ~45, multiple prior fibroids requiring extensive gynecological procedures culminating in hysterectomy, p/w increasing LBP with radiation down legs and difficulty ambulating. Started last summer in 8/2022. Increased in 2/2023 requiring hospital admission. Work-up revealed an intraosseous mass at T11 with epidural extension and high-grade cord compression.     Reportedly also had some lesions in the lumbar spine as well. Lumbar spine lesion biopsied, c/w leiomyoma. T11 lesion also biopsied as well which was also leiomyoma. Was offered surgery but transferred over to NS due to concern about possible underlying malignancy.     MRI T-spine w/wo shows T11 lesion that appears to have significant epidural extension with cord compression. Metastatic workup as above. Pt does not want an EGD this time for evaluation of the esophageal findings.     Awaiting slides from [facility] for surgical planning. Per Onc- Recommend MRI abdomen w/ IV contrast to further evaluate the liver

## 2023-04-20 NOTE — PHYSICAL THERAPY INITIAL EVALUATION ADULT - ASR WT BEARING STATUS EVAL
Implemented All Universal Safety Interventions:  [place] to call system. Call bell, personal items and telephone within reach. Instruct patient to call for assistance. Room bathroom lighting operational. Non-slip footwear when patient is off stretcher. Physically safe environment: no spills, clutter or unnecessary equipment. Stretcher in lowest position, wheels locked, appropriate side rails in place. WBAT

## 2023-04-20 NOTE — CONSULT NOTE ADULT - SUBJECTIVE AND OBJECTIVE BOX
Oncology Consult Note    HPI as per admitting team:   42F Hx HTN, obesity s/p gastric sleeve, hysterectomy, LBP since 08/2022 w/progressive BLE radic, 02/2023 the patient began having difficulty ambulating 2/2 pain, was started on steroids and sent to rehab->worsening pain, re-scanned w/new T11 lesion s/p biopsy  xfer from [facility] for T11 lesion. OSH path report c/w leimyoma. MR showing svr stenosis @ level of T11 w/enhancing lesion with a high fat content. CT CAP was corrupted.  (18 Apr 2023 17:32)        REVIEW OF SYSTEMS:    CONSTITUTIONAL: No weakness, fevers or chills  EYES/ENT: No visual changes;  No vertigo or throat pain   NECK: No pain or stiffness  RESPIRATORY: No cough, wheezing, hemoptysis; No shortness of breath  CARDIOVASCULAR: No chest pain or palpitations  GASTROINTESTINAL: No abdominal or epigastric pain. No nausea, vomiting, or hematemesis; No diarrhea or constipation. No melena or hematochezia.  GENITOURINARY: No dysuria, frequency or hematuria  NEUROLOGICAL: No numbness or weakness  SKIN: No itching, burning, rashes, or lesions   All other review of systems is negative unless indicated above.    PAST MEDICAL & SURGICAL HISTORY:      FAMILY HISTORY:      SOCIAL HISTORY:     Allergies    No Known Allergies    Intolerances        MEDICATIONS  (STANDING):  enoxaparin Injectable 40 milliGRAM(s) SubCutaneous <User Schedule>  multivitamin 1 Tablet(s) Oral daily  NIFEdipine XL 60 milliGRAM(s) Oral daily  pantoprazole    Tablet 40 milliGRAM(s) Oral before breakfast  polyethylene glycol 3350 17 Gram(s) Oral daily  senna 2 Tablet(s) Oral at bedtime    MEDICATIONS  (PRN):  acetaminophen     Tablet .. 650 milliGRAM(s) Oral every 6 hours PRN Temp greater or equal to 38C (100.4F), Mild Pain (1 - 3)  albuterol    90 MICROgram(s) HFA Inhaler 2 Puff(s) Inhalation every 6 hours PRN for shortness of breath and/or wheezing  bisacodyl 5 milliGRAM(s) Oral daily PRN Constipation  methocarbamol 500 milliGRAM(s) Oral every 8 hours PRN Muscle Spasm  ondansetron Injectable 4 milliGRAM(s) IV Push every 6 hours PRN Nausea and/or Vomiting  oxyCODONE    IR 5 milliGRAM(s) Oral every 4 hours PRN Moderate Pain (4 - 6)  oxyCODONE    IR 10 milliGRAM(s) Oral every 4 hours PRN Severe Pain (7 - 10)      OBJECTIVE       T(F): 98.4 (04-20-23 @ 08:15), Max: 98.6 (04-19-23 @ 20:41)  HR: 88 (04-20-23 @ 08:15)  BP: 123/87 (04-20-23 @ 08:15)  RR: 17 (04-20-23 @ 08:15)  SpO2: 99% (04-20-23 @ 08:15)  Wt(kg): --    PHYSICAL EXAM   GENERAL: NAD, well-developed  HEAD:  Atraumatic, Normocephalic  EYES: EOMI, PERRLA, conjunctiva and sclera clear  NECK: Supple, No JVD  CHEST/LUNG: Clear to auscultation bilaterally; No wheeze  HEART: Regular rate and rhythm; No murmurs, rubs, or gallops  ABDOMEN: Soft, Nontender, Nondistended; Bowel sounds present  EXTREMITIES:  2+ Peripheral Pulses, No clubbing, cyanosis, or edema  NEUROLOGY: non-focal  SKIN: No rashes or lesions                          12.4   12.40 )-----------( 296      ( 18 Apr 2023 15:43 )             39.0       04-18    141  |  103  |  12  ----------------------------<  112<H>  3.6   |  27  |  0.53    Ca    9.1      18 Apr 2023 15:42  Phos  5.7     04-19  Mg     1.9     04-18    TPro  6.5  /  Alb  3.7  /  TBili  0.2  /  DBili  x   /  AST  6<L>  /  ALT  15  /  AlkPhos  44  04-18           Oncology Consult Note    HPI as per admitting team:   42F Hx HTN, obesity s/p gastric sleeve, hysterectomy, LBP since 08/2022 w/progressive BLE radic, 02/2023 the patient began having difficulty ambulating 2/2 pain, was started on steroids and sent to rehab->worsening pain, re-scanned w/new T11 lesion s/p biopsy  xfer from [facility] for T11 lesion. OSH path report c/w leimyoma. MR showing svr stenosis @ level of T11 w/enhancing lesion with a high fat content. CT CAP was corrupted.  (18 Apr 2023 17:32)      REVIEW OF SYSTEMS:  CONSTITUTIONAL: No weakness, fevers or chills  EYES/ENT: No visual changes;  No vertigo or throat pain   RESPIRATORY: No cough, wheezing, hemoptysis; No shortness of breath  CARDIOVASCULAR: No chest pain or palpitations  GASTROINTESTINAL: +Acid reflux. No abdominal or epigastric pain. No nausea, vomiting, or hematemesis; No diarrhea or constipation. No melena or hematochezia.  GENITOURINARY: No dysuria, frequency or hematuria  NEUROLOGICAL: +Weakness of the right leg  SKIN: No itching, burning, rashes, or lesions   All other review of systems is negative unless indicated above.    PAST MEDICAL & SURGICAL HISTORY:  HTN  Obesity s/p gastric sleeve    s/p hysterectomy    FAMILY HISTORY:  No pertinent family history of cancer.    SOCIAL HISTORY:   Never smoked tobacco. No EtOH or illicit drug use. Lives at home with family, including two children    Allergies  No Known Allergies      MEDICATIONS  (STANDING):  enoxaparin Injectable 40 milliGRAM(s) SubCutaneous <User Schedule>  multivitamin 1 Tablet(s) Oral daily  NIFEdipine XL 60 milliGRAM(s) Oral daily  pantoprazole    Tablet 40 milliGRAM(s) Oral before breakfast  polyethylene glycol 3350 17 Gram(s) Oral daily  senna 2 Tablet(s) Oral at bedtime    MEDICATIONS  (PRN):  acetaminophen     Tablet .. 650 milliGRAM(s) Oral every 6 hours PRN Temp greater or equal to 38C (100.4F), Mild Pain (1 - 3)  albuterol    90 MICROgram(s) HFA Inhaler 2 Puff(s) Inhalation every 6 hours PRN for shortness of breath and/or wheezing  bisacodyl 5 milliGRAM(s) Oral daily PRN Constipation  methocarbamol 500 milliGRAM(s) Oral every 8 hours PRN Muscle Spasm  ondansetron Injectable 4 milliGRAM(s) IV Push every 6 hours PRN Nausea and/or Vomiting  oxyCODONE    IR 5 milliGRAM(s) Oral every 4 hours PRN Moderate Pain (4 - 6)  oxyCODONE    IR 10 milliGRAM(s) Oral every 4 hours PRN Severe Pain (7 - 10)      OBJECTIVE       T(F): 98.4 (04-20-23 @ 08:15), Max: 98.6 (04-19-23 @ 20:41)  HR: 88 (04-20-23 @ 08:15)  BP: 123/87 (04-20-23 @ 08:15)  RR: 17 (04-20-23 @ 08:15)  SpO2: 99% (04-20-23 @ 08:15)  Wt(kg): --    PHYSICAL EXAM   GENERAL: NAD, well-developed  HEAD:  Atraumatic, Normocephalic  EYES: EOMI, PERRLA, conjunctiva and sclera clear  CHEST/LUNG: Clear to auscultation bilaterally; No wheeze  HEART: Regular rate and rhythm; No murmurs, rubs, or gallops  ABDOMEN: Soft, Nontender, Nondistended; Bowel sounds present  EXTREMITIES:  2+ Peripheral Pulses, No clubbing, cyanosis, or edema  NEUROLOGY: 3/5 strength RLE, 4+/5 strength LLE  SKIN: No rashes or lesions                          12.4   12.40 )-----------( 296      ( 18 Apr 2023 15:43 )             39.0       04-18    141  |  103  |  12  ----------------------------<  112<H>  3.6   |  27  |  0.53    Ca    9.1      18 Apr 2023 15:42  Phos  5.7     04-19  Mg     1.9     04-18    TPro  6.5  /  Alb  3.7  /  TBili  0.2  /  DBili  x   /  AST  6<L>  /  ALT  15  /  AlkPhos  44  04-18

## 2023-04-20 NOTE — PROGRESS NOTE ADULT - SUBJECTIVE AND OBJECTIVE BOX
Patient is a 42y old  Female who presents with a chief complaint of T11 bony tumor (20 Apr 2023 09:21)      SUBJECTIVE / OVERNIGHT EVENTS: Pt in bed, states feels overwhelmed. Denies pain.     MEDICATIONS  (STANDING):  enoxaparin Injectable 40 milliGRAM(s) SubCutaneous <User Schedule>  multivitamin 1 Tablet(s) Oral daily  NIFEdipine XL 60 milliGRAM(s) Oral daily  pantoprazole    Tablet 40 milliGRAM(s) Oral before breakfast  senna 2 Tablet(s) Oral at bedtime    MEDICATIONS  (PRN):  acetaminophen     Tablet .. 650 milliGRAM(s) Oral every 6 hours PRN Temp greater or equal to 38C (100.4F), Mild Pain (1 - 3)  albuterol    90 MICROgram(s) HFA Inhaler 2 Puff(s) Inhalation every 6 hours PRN for shortness of breath and/or wheezing  bisacodyl 5 milliGRAM(s) Oral daily PRN Constipation  methocarbamol 500 milliGRAM(s) Oral every 8 hours PRN Muscle Spasm  oxyCODONE    IR 5 milliGRAM(s) Oral every 4 hours PRN Moderate Pain (4 - 6)  oxyCODONE    IR 10 milliGRAM(s) Oral every 4 hours PRN Severe Pain (7 - 10)  polyethylene glycol 3350 17 Gram(s) Oral daily PRN Constipation      CAPILLARY BLOOD GLUCOSE        I&O's Summary    19 Apr 2023 07:01  -  20 Apr 2023 07:00  --------------------------------------------------------  IN: 1160 mL / OUT: 0 mL / NET: 1160 mL        PHYSICAL EXAM:  T(C): 36.8 (04-20-23 @ 12:48), Max: 37 (04-19-23 @ 20:41)  HR: 82 (04-20-23 @ 12:48) (79 - 88)  BP: 109/78 (04-20-23 @ 12:48) (109/78 - 138/86)  RR: 18 (04-20-23 @ 12:48) (17 - 18)  SpO2: 99% (04-20-23 @ 12:48) (97% - 100%)  CONSTITUTIONAL: NAD, well-developed, well-groomed  EYES: PERRLA; conjunctiva and sclera clear  ENMT: Moist oral mucosa, no pharyngeal injection or exudates; normal dentition  NECK: Supple, no palpable masses; no thyromegaly  RESPIRATORY: Normal respiratory effort; lungs are clear to auscultation bilaterally  CARDIOVASCULAR: Regular rate and rhythm, normal S1 and S2, no murmur/rub/gallop; No lower extremity edema; Peripheral pulses are 2+ bilaterally  ABDOMEN: Nontender to palpation, normoactive bowel sounds, no rebound/guarding; No hepatosplenomegaly  MUSCULOSKELETAL: No clubbing or cyanosis of digits; no joint swelling or tenderness to palpation  PSYCH: A+O to person, place, and time; affect appropriate  NEUROLOGY: B/l LE weakness  SKIN: No rashes; no palpable lesions    LABS:                        12.4   12.40 )-----------( 296      ( 18 Apr 2023 15:43 )             39.0     04-18    141  |  103  |  12  ----------------------------<  112<H>  3.6   |  27  |  0.53    Ca    9.1      18 Apr 2023 15:42  Phos  5.7     04-19  Mg     1.9     04-18    TPro  6.5  /  Alb  3.7  /  TBili  0.2  /  DBili  x   /  AST  6<L>  /  ALT  15  /  AlkPhos  44  04-18    PT/INR - ( 19 Apr 2023 06:19 )   PT: 11.2 sec;   INR: 0.97 ratio         PTT - ( 19 Apr 2023 06:19 )  PTT:29.0 sec          RADIOLOGY & ADDITIONAL TESTS:    Imaging Personally Reviewed:    Consultant(s) Notes Reviewed:      Care Discussed with Consultants/Other Providers: Nsx

## 2023-04-20 NOTE — PROGRESS NOTE ADULT - SUBJECTIVE AND OBJECTIVE BOX
Patient seen and examined. In brief, 42-yo F with morbid obesity with a BMI ~45, multiple prior fibroids requiring extensive gynecological procedures culminating in hysterectomy, p/w increasing LBP with radiation down legs and difficulty ambulating. Started last summer in 8/2022. Increased in 2/2023 requiring hospital admission. Work-up revealed an intraosseous mass at T11 with epidural extension and high-grade cord compression. Reportedly also had some lesions in the lumbar spine as well. Lumbar spine lesion biopsied, c/w leiomyoma. T11 lesion also biopsied as well which was also leiomyoma. Was offered surgery but transferred over to us due to concern about possible underlying malignancy. Endorses pain radiating down the legs with subjective leg weakness, able to ambulate with walker. No bowel/bladder incontinence.    On exam, BLE 4+/5, somewhat pain-limited. Numbness from the ankles downwards to light touch. Reflexes 1+. No clonus.    MRI T-spine w/wo shows T11 lesion that appears to have significant epidural extension with cord compression.    Unclear if this represents benign metastasizing leimyoma vs. malignancy. Will pursue systemic workup including repeat staging scans, obtain pathology slides and re-review with our bone pathology team, consult heme onc and go from there.    I spent over 30 min at bedside evaluating the patient and coordinating her care.

## 2023-04-20 NOTE — PROGRESS NOTE ADULT - ASSESSMENT
42F Hx HTN, obesity s/p gastric sleeve, hysterectomy, LBP since 08/2022 w/progressive BLE radic, 02/2023 the patient began having difficulty ambulating 2/2 pain, was started on steroids and sent to rehab->worsening pain, re-scanned w/new T11 lesion s/p biopsy- xfer from [facility] for T11 lesion. OSH path report c/w leimyoma. MR showing svr stenosis @ level of T11 w/enhancing lesion with a high fat content.     Metastatic work up with wall thickening concern for esophageal mass, right hepatic lobe lesion measures 1.4 cm &  Bilateral adrenal nodules measuring 1.8 cm on the left and 1.1 cm on the right.     Pt currently in bed, c/o LE weakness, denies pain.          42F Hx HTN, morbid obesity s/p gastric sleeve, hysterectomy, LBP since 08/2022 w/progressive BLE radic, 02/2023 the patient began having difficulty ambulating 2/2 pain, was started on steroids and sent to rehab->worsening pain, re-scanned w/new T11 lesion s/p biopsy- xfer from [facility] for T11 lesion. OSH path report c/w leimyoma. MR showing svr stenosis @ level of T11 w/enhancing lesion with a high fat content.     Metastatic work up with wall thickening concern for esophageal mass, right hepatic lobe lesion measures 1.4 cm &  Bilateral adrenal nodules measuring 1.8 cm on the left and 1.1 cm on the right.     Pt currently in bed, c/o LE weakness, denies pain.

## 2023-04-20 NOTE — PHYSICAL THERAPY INITIAL EVALUATION ADULT - PERTINENT HX OF CURRENT PROBLEM, REHAB EVAL
42F Hx HTN, obesity s/p gastric sleeve, hysterectomy, LBP since 08/2022 w/progressive BLE radic, 02/2023 the patient began having difficulty ambulating 2/2 pain, was started on steroids and sent to rehab->worsening pain, re-scanned w/new T11 lesion s/p biopsy  tramsfer from [facility] for T11 lesion.   OSH path report c/w leimyoma. MR showing svr stenosis @ level of T11 w/enhancing lesion with a high fat content. Metastatic work up with wall thickening concern for esophageal mass, right hepatic lobe lesion measures 1.4 cm &  Bilateral adrenal nodules measuring 1.8 cm on the left and 1.1 cm on the right

## 2023-04-20 NOTE — CONSULT NOTE ADULT - ASSESSMENT
42F Hx HTN, obesity s/p gastric sleeve, hysterectomy, LBP since 08/2022 w/progressive BLE radic, 02/2023 the patient began having difficulty ambulating 2/2 pain, was started on steroids and sent to rehab->worsening pain, re-scanned w/new T11 lesion s/p biopsy transferred from [facility] for T11 lesion.    #Thoracic Vertebral Lesion  Patient has had progressively worsening lower back pain since August 2022. Initially presented as lower back pain but developed bilateral lower extremity radiculopathy and difficulty ambulating. She was recently found to have a T11 vertebral lesion that was biopsied at [facility]. That biopsy reportedly showed leiomyoma 42F Hx HTN, obesity s/p gastric sleeve, hysterectomy, LBP since 08/2022 w/progressive BLE radic, 02/2023 the patient began having difficulty ambulating 2/2 pain, was started on steroids and sent to rehab->worsening pain, re-scanned w/new T11 lesion s/p biopsy transferred from [facility] for T11 lesion.    #Thoracic Vertebral Lesion  Patient has had progressively worsening lower back pain since August 2022. Initially presented as lower back pain but developed bilateral lower extremity radiculopathy and difficulty ambulating. She was recently found to have a T11 vertebral lesion that was biopsied at [facility]. That biopsy reportedly showed leiomyoma.  - Slides have been requested from [facility] to be reviewed by a pathologist here.  - CT chest/abd/pelvis 4/19/23 shows wall thickening of the mid esophagus, concerning for underlying mass. Indeterminate lesions in the right hepatic lobe and bilateral adrenal glands, which may be further evaluated with contrast-enhanced abdominal MRI. Subcentimeter nodules in the left lung. Lytic lesion in the T11 vertebral body corresponding with lesion described by clinical history.  - Would prefer that patient have EGD with biopsy of esophageal thickening noted, but patient declining at this time as she has already had two biopsies and we are still waiting for the slides to be delivered and reviewed. Those two biopsies were of the primary site, so still would need to know if there are any metastatic lesions.  - Neurosurgery planning for spinal procedure and will obtain tissue  - Outpatient oncologist Dr. Marito Akbar had been contacted about her case. Will continue to keep Dr. Akbar updated regarding patient's hospital course.    Note is not finalized until signed by attending.     Rehan Desir MD  Hematology/Oncology Fellow PGY-4  Pager: [facility] [phone] / [place] [phone]  After 5pm and on weekends please page on-call fellow  42F Hx HTN, obesity s/p gastric sleeve, hysterectomy, LBP since 08/2022 w/progressive BLE radic, 02/2023 the patient began having difficulty ambulating 2/2 pain, was started on steroids and sent to rehab->worsening pain, re-scanned w/new T11 lesion s/p biopsy transferred from [facility] for T11 lesion.    #Thoracic Vertebral Lesion  Patient has had progressively worsening lower back pain since August 2022. Initially presented as lower back pain but developed bilateral lower extremity radiculopathy and difficulty ambulating. She was recently found to have a T11 vertebral lesion that was biopsied at [facility]. That biopsy reportedly showed leiomyoma.  - Slides have been requested from [facility] to be reviewed by a pathologist here.  - CT chest/abd/pelvis 4/19/23 shows wall thickening of the mid esophagus, concerning for underlying mass. Indeterminate lesions in the right hepatic lobe and bilateral adrenal glands, which may be further evaluated with contrast-enhanced abdominal MRI. Subcentimeter nodules in the left lung. Lytic lesion in the T11 vertebral body corresponding with lesion described by clinical history.  - Would prefer that patient have EGD with biopsy of esophageal thickening noted, but patient declining at this time as she has already had two biopsies and we are still waiting for the slides to be delivered and reviewed. Those two biopsies were of the primary site, so still would need to know if there are any metastatic lesions.  - Neurosurgery planning for spinal procedure and will obtain tissue  - Recommend MRI abdomen w/ IV contrast to further evaluate the liver  - Outpatient oncologist Dr. Marito Akbar had been contacted about her case. Will continue to keep Dr. Akbar updated regarding patient's hospital course.    Note is not finalized until signed by attending.     Rehan Desir MD  Hematology/Oncology Fellow PGY-4  Pager: [facility] [phone] / [place] [phone]  After 5pm and on weekends please page on-call fellow

## 2023-04-20 NOTE — PROGRESS NOTE ADULT - ASSESSMENT
42F Hx HTN, obesity s/p gastric sleeve, hysterectomy, LBP since 08/2022 w/progressive BLE radic, 02/2023 the patient began having difficulty ambulating 2/2 pain, was started on steroids and sent to rehab->worsening pain, re-scanned w/new T11 lesion s/p biopsy  xfer from [facility] for T11 lesion. OSH path report c/w leimyoma. MR showing svr stenosis @ level of T11 w/enhancing lesion with a high fat content. Metastatic work up with wall thickening concern for esophageal mass, right hepatic lobe lesion measures 1.4 cm &  Bilateral adrenal nodules measuring 1.8 cm on the left and 1.1 cm on the right    Plan     Neuro stable.  Awaiting Slides T11 lesion biopsy from [facility] . Please call Tara at [facility]  [phone] for tracking number tomorrow   Vitals stable  Medicine consulted for clearance  GI consulted for possible esophageal mass . Pt not interested for EGD. MR abdomen to characterize liver/ adrenal lesions   Oncology consult appreciated.   DVT ppx   PT eval

## 2023-04-21 PROCEDURE — 74182 MRI ABDOMEN W/CONTRAST: CPT | Mod: 26

## 2023-04-21 PROCEDURE — 99232 SBSQ HOSP IP/OBS MODERATE 35: CPT

## 2023-04-21 RX ADMIN — SENNA PLUS 2 TABLET(S): 8.6 TABLET ORAL at 21:15

## 2023-04-21 RX ADMIN — Medication 1 TABLET(S): at 11:12

## 2023-04-21 RX ADMIN — Medication 60 MILLIGRAM(S): at 06:13

## 2023-04-21 RX ADMIN — Medication 650 MILLIGRAM(S): at 06:13

## 2023-04-21 RX ADMIN — PANTOPRAZOLE SODIUM 40 MILLIGRAM(S): 20 TABLET, DELAYED RELEASE ORAL at 06:13

## 2023-04-21 RX ADMIN — Medication 650 MILLIGRAM(S): at 22:01

## 2023-04-21 RX ADMIN — ENOXAPARIN SODIUM 40 MILLIGRAM(S): 100 INJECTION SUBCUTANEOUS at 18:08

## 2023-04-21 RX ADMIN — Medication 650 MILLIGRAM(S): at 06:43

## 2023-04-21 RX ADMIN — Medication 650 MILLIGRAM(S): at 22:31

## 2023-04-21 NOTE — PROGRESS NOTE ADULT - SUBJECTIVE AND OBJECTIVE BOX
SUBJECTIVE: HPI:  42F Hx HTN, obesity s/p gastric sleeve, hysterectomy, LBP since 08/2022 w/progressive BLE radic, 02/2023 the patient began having difficulty ambulating 2/2 pain, was started on steroids and sent to rehab->worsening pain, re-scanned w/new T11 lesion s/p biopsy  xfer from [facility] for T11 lesion. OSH path report c/w leimyoma. MR showing svr stenosis @ level of T11 w/enhancing lesion with a high fat content. CT CAP was corrupted.  (18 Apr 2023 17:32)      OVERNIGHT EVENTS: No acute events overnight, patient seen and evaluated with no acute complaints. Spoke to her about her MRI Abdomen for this evening and told her starting 2pm she must be NPO for the imaging for which she understood. She had a biopsy done at [facility] for the T11 lesion, called Pathology and they reports that it has been mailed today, will follow.     Vital Signs Last 24 Hrs  T(C): 36.6 (21 Apr 2023 12:12), Max: 37.7 (20 Apr 2023 15:55)  T(F): 97.9 (21 Apr 2023 12:12), Max: 99.8 (20 Apr 2023 15:55)  HR: 78 (21 Apr 2023 12:12) (68 - 95)  BP: 105/74 (21 Apr 2023 12:12) (105/74 - 139/74)  BP(mean): --  RR: 18 (21 Apr 2023 12:12) (18 - 18)  SpO2: 100% (21 Apr 2023 12:12) (98% - 100%)    Parameters below as of 21 Apr 2023 12:12  Patient On (Oxygen Delivery Method): room air        DRAINS: None    PHYSICAL EXAM:    Constitutional: No Acute Distress     Neurological: AOx3, Following Commands, Moving all Extremities     Motor exam:          Upper extremity                         Delt     Bicep     Tricep    HG                                                 R         5/5        5/5        5/5       5/5                                               L          5/5        5/5        5/5       5/5          Lower extremity                        HF         KF        KE       DF         PF                                                  R        5/5        5/5        5/5       5/5         5/5                                               L         5/5        5/5       5/5       5/5          5/5                                                 Sensation: [] intact to light touch  [x] decreased: reports decreased sensation from below the knee b/l    Pulmonary: Clear to Auscultation, No rales, No rhonchi, No wheezes     Cardiovascular: S1, S2, Regular rate and rhythm     Gastrointestinal: Soft, Non-tender, Non-distended     Extremities: No calf tenderness     Incision: None    LABS:           MEDICATIONS:  Antibiotics:    Neuro:  acetaminophen     Tablet .. 650 milliGRAM(s) Oral every 6 hours PRN Temp greater or equal to 38C (100.4F), Mild Pain (1 - 3)  methocarbamol 500 milliGRAM(s) Oral every 8 hours PRN Muscle Spasm  oxyCODONE    IR 5 milliGRAM(s) Oral every 4 hours PRN Moderate Pain (4 - 6)  oxyCODONE    IR 10 milliGRAM(s) Oral every 4 hours PRN Severe Pain (7 - 10)    Cardiac:  NIFEdipine XL 60 milliGRAM(s) Oral daily    Pulm:  albuterol    90 MICROgram(s) HFA Inhaler 2 Puff(s) Inhalation every 6 hours PRN for shortness of breath and/or wheezing    GI/:  bisacodyl 5 milliGRAM(s) Oral daily PRN Constipation  pantoprazole    Tablet 40 milliGRAM(s) Oral before breakfast  polyethylene glycol 3350 17 Gram(s) Oral daily PRN Constipation  senna 2 Tablet(s) Oral at bedtime    Other:   enoxaparin Injectable 40 milliGRAM(s) SubCutaneous <User Schedule>  multivitamin 1 Tablet(s) Oral daily    DIET: [x - NPO at 2pm for MRI Abdomen at 6pm] Regular [] CCD [] Renal [] Puree [] Dysphagia [] Tube Feeds:     IMAGING:   < from: CT Chest w/ IV Cont (04.19.23 @ 11:51) >  IMPRESSION:  *  Wall thickening of the mid esophagus, concerning for underlying mass.   Consider further evaluation with endoscopy.  *  Indeterminate lesions in the right hepatic lobe and bilateral adrenal   glands, which may be further evaluated with contrast-enhanced abdominal   MRI.  *  Subcentimeter nodules in the left lung.  *  Lytic lesion in the T11 vertebral body corresponding with lesion   described by clinical history.        --- End of Report ---      ENIO EVANS MD; Attending Radiologist  This document has been electronically signed. Apr 19 2023  1:48PM    < end of copied text >    < from: CT Cervical Spine No Cont (04.19.23 @ 11:49) >  IMPRESSION:    Markedly coarsened osseous texture of the entire T11 vertebral body that   extends into the bilateral pedicles. There is soft tissue filling the   left neural foramen at T11/T12 and to a lesser degree within the left   T10/T11 neural foramen, unknown if this is part of the same bony process   versus disc herniations, limited by CT technique. MRI may be helpful for   further evaluation as clinically indicated.    No additional suspicious lesions of the cervical, thoracic, lumbar spine.    --- End of Report ---      DARIA KENT MD; Attending Radiologist  This document has been electronically signed. Apr 19 2023  1:22PM    < end of copied text >

## 2023-04-21 NOTE — PROGRESS NOTE ADULT - ASSESSMENT
ASSESSMENT AND PLAN: 42F Hx HTN, obesity s/p gastric sleeve, hysterectomy, LBP since 08/2022 w/progressive BLE radic, 02/2023 the patient began having difficulty ambulating 2/2 pain, was started on steroids and sent to rehab->worsening pain, re-scanned w/new T11 lesion s/p biopsy  xfer from [facility] for T11 lesion. OSH path report c/w leimyoma. MR showing svr stenosis @ level of T11 w/enhancing lesion with a high fat content.    NEURO:   - Continue neuro checks q 4  - Awaiting slides from [facility] regarding biopsy of T11 lesions - per pathology there, was mailed today, tracking number provided.  - Continue pain control w/ Tylenol prn and Oxycodone prn  - Continue Robaxin for prn muscle spasm  - PT/OT - Home PT w/ rolling walker    PULM:   - On room air, O2Sat>98%  - Incentive spirometry  - Continue prn Proventil  - CT Chest: subcentimeter nodules in left lung 4/19    CV:  - -160  - Continue Procardia for HTN    ENDO:   - A1c 5.6, goal euglycemia    HEME/ONC:             4/18 Mild leukocytosis. Appreciate Heme/Onc following patient - recommending MRI Abdomen to evaluate liver lesion - to be done today.          DVT ppx: SQL, SCDs, 4/18 [place] Dopp - negative    RENAL:   - IVL  - 4/18 BMP stable    ID:   - Afebrile  - 4/18 MRSA/MSSA negative    GI:    - Oral regular diet, NPO at 2pm for MRI Abdomen at 6pm today, after imaging can go back to regular diet  - Appreciate GI following: CT CAP shows wall thickening of the mid esophagus concerning for mass, GI offered EGD but patient refusing at this moment   - MRI Abdomen today to evaluate for liver - CT CAP shows indeterminate lesion right hepatic lobe, adrenal lesions  - Continue protonix - hx of Gastric sleeve  - Continue senna, miralax and dulcolax for bowel regimen, last BM 4/19    Appreciate Hospitalist following for medical co-management. Medically optimized per their note on 4/19.    DISCHARGE PLANNING:   PT/OT - home PT w/ rolling walker, but dispo pending hospital course.     Plan to be discussed w/ Dr. Anaya  [id number]

## 2023-04-22 PROBLEM — Z00.00 ENCOUNTER FOR PREVENTIVE HEALTH EXAMINATION: Status: ACTIVE | Noted: 2023-04-22

## 2023-04-22 PROCEDURE — 99231 SBSQ HOSP IP/OBS SF/LOW 25: CPT

## 2023-04-22 PROCEDURE — ZZZZZ: CPT

## 2023-04-22 PROCEDURE — 99231 SBSQ HOSP IP/OBS SF/LOW 25: CPT | Mod: 57

## 2023-04-22 RX ADMIN — OXYCODONE HYDROCHLORIDE 5 MILLIGRAM(S): 5 TABLET ORAL at 00:51

## 2023-04-22 RX ADMIN — PANTOPRAZOLE SODIUM 40 MILLIGRAM(S): 20 TABLET, DELAYED RELEASE ORAL at 05:39

## 2023-04-22 RX ADMIN — Medication 5 MILLIGRAM(S): at 21:27

## 2023-04-22 RX ADMIN — Medication 650 MILLIGRAM(S): at 21:57

## 2023-04-22 RX ADMIN — Medication 650 MILLIGRAM(S): at 21:27

## 2023-04-22 RX ADMIN — Medication 650 MILLIGRAM(S): at 05:37

## 2023-04-22 RX ADMIN — Medication 1 TABLET(S): at 11:30

## 2023-04-22 RX ADMIN — Medication 60 MILLIGRAM(S): at 05:36

## 2023-04-22 RX ADMIN — OXYCODONE HYDROCHLORIDE 5 MILLIGRAM(S): 5 TABLET ORAL at 01:21

## 2023-04-22 RX ADMIN — SENNA PLUS 2 TABLET(S): 8.6 TABLET ORAL at 21:27

## 2023-04-22 RX ADMIN — Medication 650 MILLIGRAM(S): at 06:07

## 2023-04-22 RX ADMIN — ENOXAPARIN SODIUM 40 MILLIGRAM(S): 100 INJECTION SUBCUTANEOUS at 18:02

## 2023-04-22 NOTE — CONSULT NOTE ADULT - ASSESSMENT
***NOTE INCOMPLETE*** 43 y/o  LMP 2015 s/p hysterectomy for fibroids, transferred from [facility] for further evaluation of T11 lesion with tissue biopsy of leiomyoma.    #T11 lesions  - No acute GYN Onc intervention; need for further records and workup  - Recommend obtaining [facility] biopsy pathology results to confirm leiomyoma, operative and pathology reports from [facility] for hysterectomy  - Additional recommendations pending pathology report and additional records review    Patient seen and discussed with fellow PGY7 Gus Culver  PGY-2   43 y/o  LMP 2015 s/p hysterectomy for fibroids, transferred from [facility] for further evaluation of T11 lesion with tissue biopsy of leiomyoma.    #T11 lesions  - No acute GYN Onc intervention; need for further records and workup  - Recommend obtaining [facility] biopsy pathology results to confirm leiomyoma, operative and pathology reports from [facility] for hysterectomy  - Additional recommendations pending pathology report and additional records review    Patient seen and discussed with fellow PGY7 Gus Culver  PGY-2    GYN ONC Fellow Addendum:  43 yo with history of fibroid uterus requiring previous myomectomies and emergent hysterectomy who developed back pain and lower extremity weakness last Summer initially treated in an OSH ER with pain medications. She was found to have bony lesion which were biopsied at [facility] and found to be leiomyoma on final pathology. She was given one dose of Lupron and sent to rehab due to mobility impairment. She had another subsequent fall and was brought back to OSH. T11 lesion biopsied at this time with same result. Patient then transferred to [facility].     She reports being diagnosed with fibroids shortly after the onset of menarche. She subsequently underwent multiple myomectomies. She was never treated with oral medications or an IUD. She reports her maternal grandmother was diagnosed with uterine cancer, unsure of exact type, at an older age. She denies family history of ovarian, breast, colon, pancreatic, renal cancers.     Discussed with patient that fibroids can disseminate to other areas of the body. This is most commonly seen with IV leiomyomatosis, in which they follow/invade the pelvic vasculature. This can also result in dissemination to the lungs. There are genetic and familial predispositions that can result in dissemination of fibroids. Review of her family and medical history make this unlikely. We discussed that the location of her lesions is not characteristic of leiomyoma.     - Path slides requested by primary team. Will follow up read by [facility]  - Please obtain operative report and pathology report from [facility] for hysterectomy  - EGD for evaluation of esophageal mass  - A trial of lupron may help reduce the size of the lesions if path review reveals leiomyoma. Final recs pending review.     ABHIJEET Weber, PGY7

## 2023-04-22 NOTE — PROGRESS NOTE ADULT - SUBJECTIVE AND OBJECTIVE BOX
SUBJECTIVE:   No new complaints. b/l feet paresthesia persists  OVERNIGHT EVENTS: none    Vital Signs Last 24 Hrs  T(C): 37.1 (22 Apr 2023 09:31), Max: 37.1 (22 Apr 2023 09:31)  T(F): 98.7 (22 Apr 2023 09:31), Max: 98.7 (22 Apr 2023 09:31)  HR: 80 (22 Apr 2023 09:31) (74 - 85)  BP: 101/77 (22 Apr 2023 09:31) (101/77 - 136/83)  BP(mean): --  RR: 18 (22 Apr 2023 09:31) (18 - 18)  SpO2: 97% (22 Apr 2023 09:31) (96% - 99%)    Parameters below as of 22 Apr 2023 09:31  Patient On (Oxygen Delivery Method): room air        PHYSICAL EXAM:    Neurological: Awake alert Ox3, Speech clear Following Commands, Moving all Extremities 5/5 B/L LE below knee decreased sensation light touch .     Pulmonary: Clear to Auscultation,    Cardiovascular: S1, S2, Regular rate and rhythm     Gastrointestinal: Soft, Non-tender, Non-distended     Extremities: No calf tenderness       LABS:           IMAGING:         MEDICATIONS:    acetaminophen     Tablet .. 650 milliGRAM(s) Oral every 6 hours PRN Temp greater or equal to 38C (100.4F), Mild Pain (1 - 3)  methocarbamol 500 milliGRAM(s) Oral every 8 hours PRN Muscle Spasm  oxyCODONE    IR 5 milliGRAM(s) Oral every 4 hours PRN Moderate Pain (4 - 6)  oxyCODONE    IR 10 milliGRAM(s) Oral every 4 hours PRN Severe Pain (7 - 10)  NIFEdipine XL 60 milliGRAM(s) Oral daily  albuterol    90 MICROgram(s) HFA Inhaler 2 Puff(s) Inhalation every 6 hours PRN for shortness of breath and/or wheezing  bisacodyl 5 milliGRAM(s) Oral daily PRN Constipation  pantoprazole    Tablet 40 milliGRAM(s) Oral before breakfast  polyethylene glycol 3350 17 Gram(s) Oral daily PRN Constipation  senna 2 Tablet(s) Oral at bedtime  enoxaparin Injectable 40 milliGRAM(s) SubCutaneous <User Schedule>  multivitamin 1 Tablet(s) Oral daily      DIET:

## 2023-04-22 NOTE — PROGRESS NOTE ADULT - SUBJECTIVE AND OBJECTIVE BOX
Patient seen and examined. No change. Appreciate GI, heme-onc recs. Still awaiting slides from [facility] for review by both bone pathology as was as gyn onc path. Will have gyn onc team evaluate to see if hormone deprivation via Lupron +/- anastrazole might be helpful in debulking epidural disease without surgery. Will await further eval of pathology slides and gyn onc team recs and go from there.    I spent 15 minutes at the bedside evaluating the patient and coordinating his care.

## 2023-04-22 NOTE — CONSULT NOTE ADULT - NSCONSULTADDITIONALINFOA_GEN_ALL_CORE
Case d/w AK. He disc path with DD, who noted ER/AZ pos.   Chart reviewed.  Seen on 4/27/23 ~630p.  See chart note.

## 2023-04-22 NOTE — CONSULT NOTE ADULT - SUBJECTIVE AND OBJECTIVE BOX
TAMMY BARKER  42y  Female [id number]    HPI:  41 y/o  LMP 2015 with PMH of HTN, fibroids s/p hysterectomy admitted to [facility] as transfer from [facility] for further evaluation of T11 bony lesion with biopsy consistent with leiomyoma. GYN oncology consulted given tissue biopsy result.    Patient notes longstanding history of fibroids - states that she was diagnosed with this early after menarche around age 13. Had an abdominal myomectomy with Dr Benedict at [facility] around . Subsequently had two  sections at [facility] in  due to prior myomectomy. Stated that during the first  an additional fibroid was removed. Then around  presented to [facility] with heavy vaginal bleeding and had an "emergency hysterectomy" requiring blood transfusions. Patient is not sure whether her cervix remains however was told that she had her ovaries left, but that her fallopian tubes and uterus were removed. Has since received one pap smear but has not had one in many years    Patient states that around last 2022, she began having difficulty with back pain and lower extremity weakness. Would present to the ED when episodes would occur and receive pain medications that would temporarily resolve. Described the back pain as sciatica and the weakness as including numbness and tingling in the feet. After multiple ED visits, the patient was then admitted to [facility] for further evaluation where it was found she had multiple lesions including hepatic lesions, bilateral adrenal nodules, and concern for esophageal mass. Patient reports that during this course and subsequent rehab course she regained some mobility. After finding out the biopsy result she saw a GYN at [facility] who gave her a dose of Lupron - was told she would get three total, next due . Following biopsy result was transferred to [facility].    Name of GYN Physician: [facility]    ObHx: pLTCS  (prior myomectomy), rLTCS   GynHx: Denies fibroids, cysts, endometriosis, STI's, Abnormal pap smears   PMHx: HTN  SurgHx: abd myomectomy, CSx2, LSC gastric sleeve, knee surgery  Meds: Tylenol, Robaxin, Oxycodone, Procardia, Miralax, Albuterol  Allergies: NKDA  Social History:  Denies smoking use, drug use, alcohol use.  HCM: no recent pap smears (5 yrs), mammogram multiple years ago wnl per patient    Vital Signs Last 24 Hrs  T(C): 37.1 (2023 09:31), Max: 37.1 (2023 09:31)  T(F): 98.7 (2023 09:31), Max: 98.7 (2023 09:31)  HR: 80 (:) (74 - 85)  BP: 101/77 (2023 09:31) (101/77 - 136/83)  BP(mean): --  RR: 18 (:) (18 - 18)  SpO2: 97% (:) (96% - 99%)    Parameters below as of 2023 09:  Patient On (Oxygen Delivery Method): room air    Physical Exam:   General: sitting comfortably in bed, NAD   HEENT: neck supple, full ROM  CV: RR S1S2 no m/r/g  Lungs: CTA b/l, good air flow b/l   Back: No CVA tenderness  Abd: LSC incision scars x3, midline vertical incision scar, low transverse incision scar; Soft, non-tender, non-distended. Bowel sounds present.    :  No bleeding. External labia wnl. Bimanual exam unremarkable with no elicited pain or tenderness. No cervix palpated.  Speculum Exam: Vaginal mucosa unremarkable, vaginal cuff intact.  Ext: non-tender b/l, no edema     Chaperoned by Luh HERNANDES    RADIOLOGY & ADDITIONAL STUDIES:  < from: CT Abdomen and Pelvis w/ IV Cont (23 @ 11:51) >  ACC: [id number] EXAM:  CT CHEST IC   ORDERED BY:  EDUAR FITZGERALD     ACC: [id number] EXAM:  CT ABDOMEN AND PELVIS IC   ORDERED BY:  EDUAR FITZGERALD     PROCEDURE DATE:  2023      INTERPRETATION:  CLINICAL INFORMATION: Evaluate for metastatic disease.   T11 bone tumor.    COMPARISON: None.    CONTRAST/COMPLICATIONS:  IV Contrast: Omnipaque 350  90 cc administered   10 cc discarded  Oral Contrast: NONE  Complications: None reported at time of study completion    PROCEDURE:  CT of the Chest, Abdomen and Pelvis was performed.  Sagittal and coronal reformats were performed.    FINDINGS:  CHEST:  LUNGS AND LARGE AIRWAYS: Patent central airways. Nonspecific groundglass   nodules measuring 5 mm in the left upper lobe and 4 mm in the left lower   lobe (3:65 and 82)  PLEURA: No pleural effusion.  VESSELS: Within normal limits.  HEART: Heart size is normal. No pericardial effusion.  MEDIASTINUM AND MERVAT: Fluid distends the proximal esophagus. Wall   thickening of the midesophagus, raising a question of underlying mass.   Small hiatal hernia.  CHEST WALL AND LOWER NECK: Within normal limits.    ABDOMEN AND PELVIS:  LIVER: Indeterminate lesion in the right hepatic lobe measures 1.4 cm.  BILE DUCTS: Normal caliber.  GALLBLADDER: Sludge and stones.  SPLEEN: Within normal limits.  PANCREAS: Within normal limits.  ADRENALS: Bilateral adrenal nodules measuring 1.8 cm on the left and 1.1   cm on the right.  KIDNEYS/URETERS: Within normal limits.    BLADDER: Within normal limits.  REPRODUCTIVE ORGANS: Hysterectomy.    BOWEL: Sleeve gastrectomy. No bowel obstruction. Appendix is normal.  PERITONEUM: No ascites.  VESSELS: Within normal limits.  RETROPERITONEUM/LYMPH NODES: No lymphadenopathy.  ABDOMINAL WALL: Within normal limits.  BONES: Lytic lesion in the T11 vertebral body, in keeping with the   clinical history.    IMPRESSION:  *  Wall thickening of the mid esophagus, concerning for underlying mass.   Consider further evaluation with endoscopy.  *  Indeterminate lesions in the right hepatic lobe and bilateral adrenal   glands, which may be further evaluated with contrast-enhanced abdominal   MRI.  *  Subcentimeter nodules in the left lung.  *  Lytic lesion in the T11 vertebral body corresponding with lesion   described by clinical history.    --- End of Report ---    ENIO EVANS MD; Attending Radiologist  This document has been electronically signed. 2023  1:48PM    < end of copied text >

## 2023-04-23 LAB
ANION GAP SERPL CALC-SCNC: 11 MMOL/L — SIGNIFICANT CHANGE UP (ref 5–17)
BUN SERPL-MCNC: 10 MG/DL — SIGNIFICANT CHANGE UP (ref 7–23)
CALCIUM SERPL-MCNC: 8.9 MG/DL — SIGNIFICANT CHANGE UP (ref 8.4–10.5)
CHLORIDE SERPL-SCNC: 102 MMOL/L — SIGNIFICANT CHANGE UP (ref 96–108)
CO2 SERPL-SCNC: 28 MMOL/L — SIGNIFICANT CHANGE UP (ref 22–31)
CREAT SERPL-MCNC: 0.54 MG/DL — SIGNIFICANT CHANGE UP (ref 0.5–1.3)
EGFR: 118 ML/MIN/1.73M2 — SIGNIFICANT CHANGE UP
GLUCOSE SERPL-MCNC: 88 MG/DL — SIGNIFICANT CHANGE UP (ref 70–99)
HCT VFR BLD CALC: 35.8 % — SIGNIFICANT CHANGE UP (ref 34.5–45)
HGB BLD-MCNC: 11.5 G/DL — SIGNIFICANT CHANGE UP (ref 11.5–15.5)
MCHC RBC-ENTMCNC: 29.2 PG — SIGNIFICANT CHANGE UP (ref 27–34)
MCHC RBC-ENTMCNC: 32.1 GM/DL — SIGNIFICANT CHANGE UP (ref 32–36)
MCV RBC AUTO: 90.9 FL — SIGNIFICANT CHANGE UP (ref 80–100)
NRBC # BLD: 0 /100 WBCS — SIGNIFICANT CHANGE UP (ref 0–0)
PLATELET # BLD AUTO: 241 K/UL — SIGNIFICANT CHANGE UP (ref 150–400)
POTASSIUM SERPL-MCNC: 3.9 MMOL/L — SIGNIFICANT CHANGE UP (ref 3.5–5.3)
POTASSIUM SERPL-SCNC: 3.9 MMOL/L — SIGNIFICANT CHANGE UP (ref 3.5–5.3)
RBC # BLD: 3.94 M/UL — SIGNIFICANT CHANGE UP (ref 3.8–5.2)
RBC # FLD: 13.4 % — SIGNIFICANT CHANGE UP (ref 10.3–14.5)
SODIUM SERPL-SCNC: 141 MMOL/L — SIGNIFICANT CHANGE UP (ref 135–145)
WBC # BLD: 6.6 K/UL — SIGNIFICANT CHANGE UP (ref 3.8–10.5)
WBC # FLD AUTO: 6.6 K/UL — SIGNIFICANT CHANGE UP (ref 3.8–10.5)

## 2023-04-23 RX ADMIN — Medication 1 TABLET(S): at 11:12

## 2023-04-23 RX ADMIN — PANTOPRAZOLE SODIUM 40 MILLIGRAM(S): 20 TABLET, DELAYED RELEASE ORAL at 06:22

## 2023-04-23 RX ADMIN — ENOXAPARIN SODIUM 40 MILLIGRAM(S): 100 INJECTION SUBCUTANEOUS at 17:46

## 2023-04-23 RX ADMIN — Medication 650 MILLIGRAM(S): at 05:54

## 2023-04-23 RX ADMIN — SENNA PLUS 2 TABLET(S): 8.6 TABLET ORAL at 21:04

## 2023-04-23 RX ADMIN — Medication 650 MILLIGRAM(S): at 20:11

## 2023-04-23 RX ADMIN — Medication 60 MILLIGRAM(S): at 05:55

## 2023-04-23 RX ADMIN — Medication 650 MILLIGRAM(S): at 06:22

## 2023-04-23 RX ADMIN — Medication 650 MILLIGRAM(S): at 21:11

## 2023-04-23 NOTE — PROGRESS NOTE ADULT - SUBJECTIVE AND OBJECTIVE BOX
Patient seen and examined at bedside.    --Anticoagulation--  enoxaparin Injectable 40 milliGRAM(s) SubCutaneous <User Schedule>    T(C): 36.8 (04-23-23 @ 11:51), Max: 37.1 (04-22-23 @ 20:51)  HR: 80 (04-23-23 @ 11:51) (75 - 90)  BP: 121/80 (04-23-23 @ 11:51) (107/72 - 127/84)  RR: 18 (04-23-23 @ 11:51) (18 - 18)  SpO2: 100% (04-23-23 @ 11:51) (95% - 100%)  Wt(kg): --    Exam: Awake alert Ox3, Speech clear Following Commands, Moving all Extremities 5/5 B/L LE below knee decreased sensation light touch .

## 2023-04-24 PROCEDURE — 99231 SBSQ HOSP IP/OBS SF/LOW 25: CPT

## 2023-04-24 PROCEDURE — 99232 SBSQ HOSP IP/OBS MODERATE 35: CPT

## 2023-04-24 RX ADMIN — Medication 60 MILLIGRAM(S): at 05:12

## 2023-04-24 RX ADMIN — Medication 1 TABLET(S): at 11:58

## 2023-04-24 RX ADMIN — OXYCODONE HYDROCHLORIDE 5 MILLIGRAM(S): 5 TABLET ORAL at 12:31

## 2023-04-24 RX ADMIN — ENOXAPARIN SODIUM 40 MILLIGRAM(S): 100 INJECTION SUBCUTANEOUS at 17:36

## 2023-04-24 RX ADMIN — Medication 650 MILLIGRAM(S): at 21:27

## 2023-04-24 RX ADMIN — Medication 650 MILLIGRAM(S): at 20:27

## 2023-04-24 RX ADMIN — OXYCODONE HYDROCHLORIDE 5 MILLIGRAM(S): 5 TABLET ORAL at 11:58

## 2023-04-24 RX ADMIN — Medication 650 MILLIGRAM(S): at 07:37

## 2023-04-24 RX ADMIN — Medication 650 MILLIGRAM(S): at 08:15

## 2023-04-24 RX ADMIN — PANTOPRAZOLE SODIUM 40 MILLIGRAM(S): 20 TABLET, DELAYED RELEASE ORAL at 05:12

## 2023-04-24 NOTE — DIETITIAN INITIAL EVALUATION ADULT - PERTINENT MEDS FT
MEDICATIONS  (STANDING):  enoxaparin Injectable 40 milliGRAM(s) SubCutaneous <User Schedule>  multivitamin 1 Tablet(s) Oral daily  NIFEdipine XL 60 milliGRAM(s) Oral daily  pantoprazole    Tablet 40 milliGRAM(s) Oral before breakfast  senna 2 Tablet(s) Oral at bedtime    MEDICATIONS  (PRN):  acetaminophen     Tablet .. 650 milliGRAM(s) Oral every 6 hours PRN Temp greater or equal to 38C (100.4F), Mild Pain (1 - 3)  albuterol    90 MICROgram(s) HFA Inhaler 2 Puff(s) Inhalation every 6 hours PRN for shortness of breath and/or wheezing  bisacodyl 5 milliGRAM(s) Oral daily PRN Constipation  methocarbamol 500 milliGRAM(s) Oral every 8 hours PRN Muscle Spasm  oxyCODONE    IR 5 milliGRAM(s) Oral every 4 hours PRN Moderate Pain (4 - 6)  oxyCODONE    IR 10 milliGRAM(s) Oral every 4 hours PRN Severe Pain (7 - 10)  polyethylene glycol 3350 17 Gram(s) Oral daily PRN Constipation

## 2023-04-24 NOTE — DIETITIAN INITIAL EVALUATION ADULT - PERTINENT LABORATORY DATA
04-23    141  |  102  |  10  ----------------------------<  88  3.9   |  28  |  0.54    Ca    8.9      23 Apr 2023 07:21    A1C with Estimated Average Glucose Result: 5.6 % (04-18-23 @ 15:43)

## 2023-04-24 NOTE — DIETITIAN INITIAL EVALUATION ADULT - REASON FOR ADMISSION
Kissing spine    Per chart: 42F Hx HTN, obesity s/p gastric sleeve, hysterectomy, LBP since 08/2022 w/progressive BLE radic, 02/2023 the patient began having difficulty ambulating 2/2 pain, was started on steroids and sent to rehab->worsening pain, re-scanned w/new T11 lesion s/p biopsy  xfer from [facility] for T11 lesion. OSH path report c/w leimyoma. MR showing svr stenosis @ level of T11 w/enhancing lesion with a high fat content. CT CAP was corrupted.

## 2023-04-24 NOTE — PROGRESS NOTE ADULT - ASSESSMENT
42F Hx HTN, morbid obesity s/p gastric sleeve, hysterectomy, LBP since 08/2022 w/progressive BLE radic, 02/2023 the patient began having difficulty ambulating 2/2 pain, was started on steroids and sent to rehab->worsening pain, re-scanned w/new T11 lesion s/p biopsy- xfer from [facility] for T11 lesion. OSH path report c/w leimyoma. MR showing svr stenosis @ level of T11 w/enhancing lesion with a high fat content.     Metastatic work up with wall thickening concern for esophageal mass, right hepatic lobe lesion measures 1.4 cm &  Bilateral adrenal nodules measuring 1.8 cm on the left and 1.1 cm on the right.     Pt currently in bed, c/o LE weakness, denies pain.

## 2023-04-24 NOTE — PROGRESS NOTE ADULT - PROBLEM SELECTOR PLAN 1
Pt w morbid obesity with a BMI ~45, multiple prior fibroids requiring extensive gynecological procedures culminating in hysterectomy, p/w increasing LBP with radiation down legs and difficulty ambulating. Started last summer in 8/2022. Increased in 2/2023 requiring hospital admission. Work-up revealed an intraosseous mass at T11 with epidural extension and high-grade cord compression.     Reportedly also had some lesions in the lumbar spine as well. Lumbar spine lesion biopsied, c/w leiomyoma. T11 lesion also biopsied as well which was also leiomyoma. Was offered surgery but transferred over to NS due to concern about possible underlying malignancy.     MRI T-spine w/wo shows T11 lesion that appears to have significant epidural extension with cord compression. Metastatic workup as above. Pt does not want an EGD this time for evaluation of the esophageal findings.     Awaiting slides from [facility] for surgical planning.     MRI abdomen on 4/21- A 1.8 cm right hepatic lobe hemangioma. Bilateral adrenal adenomas. Redemonstrationof T11 vertebral body mass with soft tissue invasion of the spinal canal. Two subcutaneous masses involving thelower back/upper buttock and ventral abdominal wall as detailed .    Pt received 1 dose of Lupron a month back. Per Gyn- await path results before continuing.

## 2023-04-24 NOTE — PROGRESS NOTE ADULT - SUBJECTIVE AND OBJECTIVE BOX
Patient is a 42y old  Female who presents with a chief complaint of Kissing spine    Per chart: 42F Hx HTN, obesity s/p gastric sleeve, hysterectomy, LBP since 08/2022 w/progressive BLE radic, 02/2023 the patient began having difficulty ambulating 2/2 pain, was started on steroids and sent to rehab->worsening pain, re-scanned w/new T11 lesion s/p biopsy  xfer from [facility] for T11 lesion. OSH path report c/w leimyoma. MR showing svr stenosis @ level of T11 w/enhancing lesion with a high fat content. CT CAP was corrupted.  (24 Apr 2023 11:28)      SUBJECTIVE / OVERNIGHT EVENTS: Pt up in bed, denies pain. Able to ambulate w PT.     MEDICATIONS  (STANDING):  enoxaparin Injectable 40 milliGRAM(s) SubCutaneous <User Schedule>  multivitamin 1 Tablet(s) Oral daily  NIFEdipine XL 60 milliGRAM(s) Oral daily  pantoprazole    Tablet 40 milliGRAM(s) Oral before breakfast  senna 2 Tablet(s) Oral at bedtime    MEDICATIONS  (PRN):  acetaminophen     Tablet .. 650 milliGRAM(s) Oral every 6 hours PRN Temp greater or equal to 38C (100.4F), Mild Pain (1 - 3)  albuterol    90 MICROgram(s) HFA Inhaler 2 Puff(s) Inhalation every 6 hours PRN for shortness of breath and/or wheezing  bisacodyl 5 milliGRAM(s) Oral daily PRN Constipation  methocarbamol 500 milliGRAM(s) Oral every 8 hours PRN Muscle Spasm  oxyCODONE    IR 10 milliGRAM(s) Oral every 4 hours PRN Severe Pain (7 - 10)  oxyCODONE    IR 5 milliGRAM(s) Oral every 4 hours PRN Moderate Pain (4 - 6)  polyethylene glycol 3350 17 Gram(s) Oral daily PRN Constipation      CAPILLARY BLOOD GLUCOSE        I&O's Summary    23 Apr 2023 07:01  -  24 Apr 2023 07:00  --------------------------------------------------------  IN: 440 mL / OUT: 0 mL / NET: 440 mL    24 Apr 2023 07:01  -  24 Apr 2023 16:20  --------------------------------------------------------  IN: 400 mL / OUT: 0 mL / NET: 400 mL        PHYSICAL EXAM:  T(C): 36.7 (04-24-23 @ 12:20), Max: 37.1 (04-23-23 @ 20:40)  HR: 81 (04-24-23 @ 15:13) (72 - 90)  BP: 118/86 (04-24-23 @ 15:13) (104/77 - 121/86)  RR: 18 (04-24-23 @ 15:13) (18 - 18)  SpO2: 95% (04-24-23 @ 15:13) (95% - 99%)  CONSTITUTIONAL: NAD, well-developed, well-groomed  EYES: PERRLA; conjunctiva and sclera clear  ENMT: Moist oral mucosa, no pharyngeal injection or exudates; normal dentition  NECK: Supple, no palpable masses; no thyromegaly  RESPIRATORY: Normal respiratory effort; lungs are clear to auscultation bilaterally  CARDIOVASCULAR: Regular rate and rhythm, normal S1 and S2, no murmur/rub/gallop; No lower extremity edema; Peripheral pulses are 2+ bilaterally  ABDOMEN: Nontender to palpation, normoactive bowel sounds, no rebound/guarding; No hepatosplenomegaly  MUSCULOSKELETAL: No clubbing or cyanosis of digits; no joint swelling or tenderness to palpation  PSYCH: A+O to person, place, and time; affect appropriate  NEUROLOGY: CN 2-12 are intact and symmetric; no gross sensory deficits   SKIN: No rashes; no palpable lesions    LABS:                        11.5   6.60  )-----------( 241      ( 23 Apr 2023 07:22 )             35.8     04-23    141  |  102  |  10  ----------------------------<  88  3.9   |  28  |  0.54    Ca    8.9      23 Apr 2023 07:21          RADIOLOGY & ADDITIONAL TESTS:    Imaging Personally Reviewed:    Consultant(s) Notes Reviewed:      Care Discussed with Consultants/Other Providers: NSx

## 2023-04-24 NOTE — DIETITIAN INITIAL EVALUATION ADULT - ORAL INTAKE PTA/DIET HISTORY
Pt reports having a good appetite and PO intake PTA; consuming >75% of most meals. Follows regular diet. Pt denies any known food allergies or intolerances. Pt reports taking a Multivitamin daily at home. Denies any difficulty chewing/swallowing at this time.

## 2023-04-24 NOTE — PROGRESS NOTE ADULT - SUBJECTIVE AND OBJECTIVE BOX
SUBJECTIVE:     OVERNIGHT EVENTS: none    Vital Signs Last 24 Hrs  T(C): 36.9 (24 Apr 2023 16:07), Max: 37.1 (23 Apr 2023 20:40)  T(F): 98.4 (24 Apr 2023 16:07), Max: 98.7 (23 Apr 2023 20:40)  HR: 74 (24 Apr 2023 16:07) (72 - 90)  BP: 132/81 (24 Apr 2023 16:07) (104/77 - 132/81)  BP(mean): --  RR: 18 (24 Apr 2023 16:07) (18 - 18)  SpO2: 99% (24 Apr 2023 16:07) (95% - 99%)    Parameters below as of 24 Apr 2023 16:07  Patient On (Oxygen Delivery Method): room air        PHYSICAL EXAM:    Neurological: Awake alert Ox3, Speech clear Following Commands, Moving all Extremities 5/5 B/L LE below knee decreased sensation light touch .     Pulmonary: Clear to Auscultation,    Cardiovascular: S1, S2, Regular rate and rhythm     Gastrointestinal: Soft, Non-tender, Non-distended     Extremities: No calf tenderness     LABS:                        11.5   6.60  )-----------( 241      ( 23 Apr 2023 07:22 )             35.8    04-23    141  |  102  |  10  ----------------------------<  88  3.9   |  28  |  0.54    Ca    8.9      23 Apr 2023 07:21          IMAGING:         MEDICATIONS:  acetaminophen     Tablet .. 650 milliGRAM(s) Oral every 6 hours PRN Temp greater or equal to 38C (100.4F), Mild Pain (1 - 3)  methocarbamol 500 milliGRAM(s) Oral every 8 hours PRN Muscle Spasm  oxyCODONE    IR 10 milliGRAM(s) Oral every 4 hours PRN Severe Pain (7 - 10)  oxyCODONE    IR 5 milliGRAM(s) Oral every 4 hours PRN Moderate Pain (4 - 6)  NIFEdipine XL 60 milliGRAM(s) Oral jacobo  albuterol    90 MICROgram(s) HFA Inhaler 2 Puff(s) Inhalation every 6 hours PRN for shortness of breath and/or wheezing  bisacodyl 5 milliGRAM(s) Oral daily PRN Constipation  pantoprazole    Tablet 40 milliGRAM(s) Oral before breakfast  polyethylene glycol 3350 17 Gram(s) Oral daily PRN Constipation  senna 2 Tablet(s) Oral at bedtime  enoxaparin Injectable 40 milliGRAM(s) SubCutaneous <User Schedule>  multivitamin 1 Tablet(s) Oral daily      DIET:

## 2023-04-24 NOTE — DIETITIAN INITIAL EVALUATION ADULT - ADD RECOMMEND
1) Continue Regular diet as tolerated. Defer texture/consistency to SLP/team.   2) Continue providing Multivitamin daily pending no medical contraindications.  3) Continue to monitor PO intake, weight, labs, skin, GI status, and diet.  4) RD remains available for diet education PRN.

## 2023-04-24 NOTE — DIETITIAN INITIAL EVALUATION ADULT - OTHER INFO
- UBW: ~240 pounds   - Per pt, Height: 5'7  - Calculated BMI using pt reported Ht and UBW: 37.6  - Dosing wt: not present in chart.   - Wt hx per chart: No new weights available per chart at this time.   - RD to continue to monitor weight trends as able.   - Nutritionally Pertinent Meds in-house: Multivitamin, protonix.   - Nutritionally Pertinent Labs: high Phos (on 4/19), A1c 5.6% (4/18) - WNL.   - Per GI note:        - Hx Gastric sleeve 3 years ago.        - Clinically tolerating PO diet.

## 2023-04-25 ENCOUNTER — OUTPATIENT (OUTPATIENT)
Dept: OUTPATIENT SERVICES | Facility: HOSPITAL | Age: 43
LOS: 1 days | End: 2023-04-25
Payer: MEDICARE

## 2023-04-25 ENCOUNTER — RESULT REVIEW (OUTPATIENT)
Age: 43
End: 2023-04-25

## 2023-04-25 DIAGNOSIS — C80.1 MALIGNANT (PRIMARY) NEOPLASM, UNSPECIFIED: ICD-10-CM

## 2023-04-25 PROCEDURE — 88321 CONSLTJ&REPRT SLD PREP ELSWR: CPT

## 2023-04-25 PROCEDURE — 99232 SBSQ HOSP IP/OBS MODERATE 35: CPT | Mod: 57

## 2023-04-25 RX ADMIN — PANTOPRAZOLE SODIUM 40 MILLIGRAM(S): 20 TABLET, DELAYED RELEASE ORAL at 05:15

## 2023-04-25 RX ADMIN — SENNA PLUS 2 TABLET(S): 8.6 TABLET ORAL at 21:32

## 2023-04-25 RX ADMIN — Medication 60 MILLIGRAM(S): at 05:15

## 2023-04-25 RX ADMIN — Medication 1 TABLET(S): at 11:13

## 2023-04-25 RX ADMIN — POLYETHYLENE GLYCOL 3350 17 GRAM(S): 17 POWDER, FOR SOLUTION ORAL at 18:23

## 2023-04-25 RX ADMIN — Medication 650 MILLIGRAM(S): at 19:10

## 2023-04-25 RX ADMIN — Medication 650 MILLIGRAM(S): at 18:22

## 2023-04-25 NOTE — PROGRESS NOTE ADULT - ASSESSMENT
42F Hx HTN, obesity s/p gastric sleeve, hysterectomy, LBP since 08/2022 w/progressive BLE radic, 02/2023 the patient began having difficulty ambulating 2/2 pain, was started on steroids and sent to rehab->worsening pain, re-scanned w/new T11 lesion s/p biopsy  xfer from [facility] for T11 lesion. OSH path report c/w leimyoma. MR showing svr stenosis @ level of T11 w/enhancing lesion with a high fat content. MR abdomen- 1.8 cm right hepatic lobe hemangioma. Bilateral adrenal adenomas, 2.8 cm round, enhancing subcutaneous mass right of midline at the level of S1 & 1cm enhancing subcutaneous nodule ventral abdominal wall     Plan     Neuro stable.  Awaiting Slides T11 lesion biopsy to be evaluated by pathologist here. Slides in enroute . Possible OR later  this week   Vitals stable  Oncology consult appreciated.   DVT ppx    42F Hx HTN, obesity s/p gastric sleeve, hysterectomy, LBP since 08/2022 w/progressive BLE radic, 02/2023 the patient began having difficulty ambulating 2/2 pain, was started on steroids and sent to rehab->worsening pain, re-scanned w/new T11 lesion s/p biopsy  xfer from [facility] for T11 lesion. OSH path report c/w leimyoma. MR showing svr stenosis @ level of T11 w/enhancing lesion with a high fat content. MR abdomen- 1.8 cm right hepatic lobe hemangioma. Bilateral adrenal adenomas, 2.8 cm round, enhancing subcutaneous mass right of midline at the level of S1 & 1cm enhancing subcutaneous nodule ventral abdominal wall     Plan     Neuro stable.   C/w neuro checks q 4hrs   Awaiting Slides T11 lesion biopsy to be evaluated by pathologist here. Slides in enroute .  Possible OR later  this week     CV  Hemodynamically stable  c/w Nifedipine    Pulmonary:   Keep sat > 94  IS    GI  Regular diet  PPX c/w protonix  bowel regimen: Miralax/senna  last BM    Endo: no issue    ID   Afebrile    Heme/onc  Oncology consult appreciated.   DVT ppx : SQL    Will discuss with Dr Anaya  [id number]

## 2023-04-25 NOTE — PROGRESS NOTE ADULT - SUBJECTIVE AND OBJECTIVE BOX
HPI:  42F Hx HTN, obesity s/p gastric sleeve, hysterectomy, LBP since 08/2022 w/progressive BLE radic, 02/2023 the patient began having difficulty ambulating 2/2 pain, was started on steroids and sent to rehab->worsening pain, re-scanned w/new T11 lesion s/p biopsy  xfer from [facility] for T11 lesion. OSH path report c/w leimyoma. MR showing svr stenosis @ level of T11 w/enhancing lesion with a high fat content. CT CAP was corrupted.  (18 Apr 2023 17:32)    OVERNIGHT EVENTS:  Vital Signs Last 24 Hrs  T(C): 36.8 (25 Apr 2023 05:00), Max: 36.9 (24 Apr 2023 16:07)  T(F): 98.3 (25 Apr 2023 05:00), Max: 98.4 (24 Apr 2023 16:07)  HR: 77 (25 Apr 2023 05:00) (74 - 83)  BP: 116/81 (25 Apr 2023 05:00) (116/81 - 132/81)  BP(mean): --  RR: 18 (25 Apr 2023 05:00) (18 - 18)  SpO2: 99% (25 Apr 2023 05:00) (95% - 99%)    Parameters below as of 25 Apr 2023 05:00  Patient On (Oxygen Delivery Method): room air        I&O's Detail    24 Apr 2023 07:01  -  25 Apr 2023 07:00  --------------------------------------------------------  IN:    Oral Fluid: 1610 mL  Total IN: 1610 mL    OUT:  Total OUT: 0 mL    Total NET: 1610 mL        I&O's Summary    24 Apr 2023 07:01  -  25 Apr 2023 07:00  --------------------------------------------------------  IN: 1610 mL / OUT: 0 mL / NET: 1610 mL        PHYSICAL EXAM:  Neurological:    Motor exam:         [] Upper extremity                 D             B          T          WE       WF      HI                                               R         5/5        5/5        5/5       5/5     5/5       5/5                                               L          5/5        5/5        5/5       5/5     5/5       5/5         [] Lower extremity               HF            KF        KE         EHL        FHL                                               R        5/5        5/5        5/5       5/5         5/5                                               L         5/5        5/5       5/5       5/5          5/5                                                        [] warm well perfused; capillary refill <3 seconds     Sensation: [] intact to light touch  [] decreased:       Gait    Cardiovascular: Regular  Respiratory: Clear bilaterally  Gastrointestinal: Abd soft + BS non tender  Genitourinary:  Extremities: -C/C/E  Incision/Wound: Intact    TUBES/LINES:  [] CVC  [] A-line  [] Lumbar Drain  [] Ventriculostomy  [] Other    DIET:  [] NPO  [] Mechanical  [] Tube feeds    LABS:                  CAPILLARY BLOOD GLUCOSE              Drug Levels: [] N/A    CSF Analysis: [] N/A      Allergies    No Known Allergies    Intolerances      MEDICATIONS:  Antibiotics:    Neuro:  acetaminophen     Tablet .. 650 milliGRAM(s) Oral every 6 hours PRN  methocarbamol 500 milliGRAM(s) Oral every 8 hours PRN  oxyCODONE    IR 10 milliGRAM(s) Oral every 4 hours PRN  oxyCODONE    IR 5 milliGRAM(s) Oral every 4 hours PRN    Anticoagulation:  enoxaparin Injectable 40 milliGRAM(s) SubCutaneous <User Schedule>    OTHER:  albuterol    90 MICROgram(s) HFA Inhaler 2 Puff(s) Inhalation every 6 hours PRN  bisacodyl 5 milliGRAM(s) Oral daily PRN  NIFEdipine XL 60 milliGRAM(s) Oral daily  pantoprazole    Tablet 40 milliGRAM(s) Oral before breakfast  polyethylene glycol 3350 17 Gram(s) Oral daily PRN  senna 2 Tablet(s) Oral at bedtime    IVF:  multivitamin 1 Tablet(s) Oral daily    CULTURES:    RADIOLOGY & ADDITIONAL TESTS:           42F Hx HTN, obesity s/p gastric sleeve, hysterectomy, LBP since 08/2022 w/progressive BLE radic, 02/2023 the patient began having difficulty ambulating 2/2 pain, was started on steroids and sent to rehab->worsening pain, re-scanned w/new T11 lesion s/p biopsy  xfer from [facility] for T11 lesion. OSH path report c/w leimyoma. MR showing svr stenosis @ level of T11 w/enhancing lesion with a high fat content. CT CAP was corrupted.      OVERNIGHT EVENTS: no acute event    Vital Signs Last 24 Hrs  T(C): 36.8 (25 Apr 2023 05:00), Max: 36.9 (24 Apr 2023 16:07)  T(F): 98.3 (25 Apr 2023 05:00), Max: 98.4 (24 Apr 2023 16:07)  HR: 77 (25 Apr 2023 05:00) (74 - 83)  BP: 116/81 (25 Apr 2023 05:00) (116/81 - 132/81)  BP(mean): --  RR: 18 (25 Apr 2023 05:00) (18 - 18)  SpO2: 99% (25 Apr 2023 05:00) (95% - 99%)    Parameters below as of 25 Apr 2023 05:00  Patient On (Oxygen Delivery Method): room air        I&O's Detail    24 Apr 2023 07:01  -  25 Apr 2023 07:00  --------------------------------------------------------  IN:    Oral Fluid: 1610 mL  Total IN: 1610 mL    OUT:  Total OUT: 0 mL    Total NET: 1610 mL        I&O's Summary    24 Apr 2023 07:01  -  25 Apr 2023 07:00  --------------------------------------------------------  IN: 1610 mL / OUT: 0 mL / NET: 1610 mL        PHYSICAL EXAM:  Neurological:    Motor exam:         [x] Upper extremity                 D             B          T          WE       WF      HI                                               R         5/5        5/5        5/5       5/5     5/5       5/5                                               L          5/5        5/5        5/5       5/5     5/5       5/5         [x] Lower extremity               HF            KF        KE         EHL        FHL                                               R        5/5        5/5        5/5       5/5         5/5                                               L         5/5        5/5       5/5       5/5          5/5                Sensation: [] intact to light touch  [x] decreased: LE'S below Knee      Gait    Cardiovascular: Regular  Respiratory: Clear bilaterally  Gastrointestinal: Abd soft + BS non tender  Genitourinary:  Extremities: -C/C/E  Incision/Wound: Intact    TUBES/LINES:  [] CVC  [] A-line  [] Lumbar Drain  [] Ventriculostomy  [] Other    DIET:  [] NPO  [] Mechanical  [] Tube feeds    LABS:                  CAPILLARY BLOOD GLUCOSE              Drug Levels: [] N/A    CSF Analysis: [] N/A      Allergies    No Known Allergies    Intolerances      MEDICATIONS:  Antibiotics:    Neuro:  acetaminophen     Tablet .. 650 milliGRAM(s) Oral every 6 hours PRN  methocarbamol 500 milliGRAM(s) Oral every 8 hours PRN  oxyCODONE    IR 10 milliGRAM(s) Oral every 4 hours PRN  oxyCODONE    IR 5 milliGRAM(s) Oral every 4 hours PRN    Anticoagulation:  enoxaparin Injectable 40 milliGRAM(s) SubCutaneous <User Schedule>    OTHER:  albuterol    90 MICROgram(s) HFA Inhaler 2 Puff(s) Inhalation every 6 hours PRN  bisacodyl 5 milliGRAM(s) Oral daily PRN  NIFEdipine XL 60 milliGRAM(s) Oral daily  pantoprazole    Tablet 40 milliGRAM(s) Oral before breakfast  polyethylene glycol 3350 17 Gram(s) Oral daily PRN  senna 2 Tablet(s) Oral at bedtime    IVF:  multivitamin 1 Tablet(s) Oral daily    CULTURES:    RADIOLOGY & ADDITIONAL TESTS:

## 2023-04-26 LAB — SURGICAL PATHOLOGY STUDY: SIGNIFICANT CHANGE UP

## 2023-04-26 PROCEDURE — 93970 EXTREMITY STUDY: CPT | Mod: 26

## 2023-04-26 PROCEDURE — 99222 1ST HOSP IP/OBS MODERATE 55: CPT | Mod: 57

## 2023-04-26 PROCEDURE — 99232 SBSQ HOSP IP/OBS MODERATE 35: CPT

## 2023-04-26 RX ORDER — POLYETHYLENE GLYCOL 3350 17 G/17G
17 POWDER, FOR SOLUTION ORAL
Refills: 0 | Status: DISCONTINUED | OUTPATIENT
Start: 2023-04-26 | End: 2023-04-29

## 2023-04-26 RX ORDER — NIFEDIPINE 30 MG
30 TABLET, EXTENDED RELEASE 24 HR ORAL DAILY
Refills: 0 | Status: DISCONTINUED | OUTPATIENT
Start: 2023-04-27 | End: 2023-04-29

## 2023-04-26 RX ORDER — CHLORHEXIDINE GLUCONATE 213 G/1000ML
1 SOLUTION TOPICAL ONCE
Refills: 0 | Status: DISCONTINUED | OUTPATIENT
Start: 2023-04-27 | End: 2023-04-27

## 2023-04-26 RX ADMIN — Medication 650 MILLIGRAM(S): at 12:46

## 2023-04-26 RX ADMIN — Medication 650 MILLIGRAM(S): at 13:30

## 2023-04-26 RX ADMIN — PANTOPRAZOLE SODIUM 40 MILLIGRAM(S): 20 TABLET, DELAYED RELEASE ORAL at 07:01

## 2023-04-26 RX ADMIN — Medication 1 TABLET(S): at 12:47

## 2023-04-26 RX ADMIN — ENOXAPARIN SODIUM 40 MILLIGRAM(S): 100 INJECTION SUBCUTANEOUS at 18:11

## 2023-04-26 RX ADMIN — Medication 60 MILLIGRAM(S): at 05:05

## 2023-04-26 RX ADMIN — POLYETHYLENE GLYCOL 3350 17 GRAM(S): 17 POWDER, FOR SOLUTION ORAL at 18:11

## 2023-04-26 NOTE — PROGRESS NOTE ADULT - PROBLEM SELECTOR PLAN 1
Pt w morbid obesity with a BMI ~45, multiple prior fibroids requiring extensive gynecological procedures culminating in hysterectomy, p/w increasing LBP with radiation down legs and difficulty ambulating. Started last summer in 8/2022. Increased in 2/2023 requiring hospital admission. Work-up revealed an intraosseous mass at T11 with epidural extension and high-grade cord compression.     Reportedly also had some lesions in the lumbar spine as well. Lumbar spine lesion biopsied, c/w leiomyoma. T11 lesion also biopsied as well which was also leiomyoma. Was offered surgery but transferred over to NS due to concern about possible underlying malignancy.     MRI T-spine w/wo shows T11 lesion that appears to have significant epidural extension with cord compression. Metastatic workup as above. Pt does not want an EGD this time for evaluation of the esophageal findings.     Slides from [facility] for surgical planning- path w Leiomyoma. Surgery likely end of this week.     MRI abdomen on 4/21- A 1.8 cm right hepatic lobe hemangioma. Bilateral adrenal adenomas. Redemonstrationof T11 vertebral body mass with soft tissue invasion of the spinal canal. Two subcutaneous masses involving thelower back/upper buttock and ventral abdominal wall as detailed .    Pt received 1 dose of Lupron a month back. Gyn input on re-dosing.

## 2023-04-26 NOTE — PROGRESS NOTE ADULT - ASSESSMENT
42F Hx HTN, morbid obesity s/p gastric sleeve, hysterectomy, LBP since 08/2022 w/progressive BLE radic, 02/2023 the patient began having difficulty ambulating 2/2 pain, was started on steroids and sent to rehab->worsening pain, re-scanned w/new T11 lesion s/p biopsy- xfer from [facility] for T11 lesion. OSH path report c/w leimyoma. MR showing svr stenosis @ level of T11 w/enhancing lesion with a high fat content.     Metastatic work up with wall thickening concern for esophageal mass, right hepatic lobe lesion measures 1.4 cm &  Bilateral adrenal nodules measuring 1.8 cm on the left and 1.1 cm on the right.     Pt currently in bed, c/o LE weakness, denies pain.

## 2023-04-26 NOTE — PROGRESS NOTE ADULT - SUBJECTIVE AND OBJECTIVE BOX
Patient is a 42y old  Female who presents with a chief complaint of T11 bony tumor (26 Apr 2023 12:00)      SUBJECTIVE / OVERNIGHT EVENTS: Pt in bed, feels well, denies pain.     MEDICATIONS  (STANDING):  enoxaparin Injectable 40 milliGRAM(s) SubCutaneous <User Schedule>  multivitamin 1 Tablet(s) Oral daily  NIFEdipine XL 60 milliGRAM(s) Oral daily  pantoprazole    Tablet 40 milliGRAM(s) Oral before breakfast  polyethylene glycol 3350 17 Gram(s) Oral two times a day  senna 2 Tablet(s) Oral at bedtime    MEDICATIONS  (PRN):  acetaminophen     Tablet .. 650 milliGRAM(s) Oral every 6 hours PRN Temp greater or equal to 38C (100.4F), Mild Pain (1 - 3)  albuterol    90 MICROgram(s) HFA Inhaler 2 Puff(s) Inhalation every 6 hours PRN for shortness of breath and/or wheezing  bisacodyl 5 milliGRAM(s) Oral daily PRN Constipation  methocarbamol 500 milliGRAM(s) Oral every 8 hours PRN Muscle Spasm      CAPILLARY BLOOD GLUCOSE        I&O's Summary    25 Apr 2023 07:01  -  26 Apr 2023 07:00  --------------------------------------------------------  IN: 840 mL / OUT: 0 mL / NET: 840 mL    26 Apr 2023 07:01  -  26 Apr 2023 13:23  --------------------------------------------------------  IN: 240 mL / OUT: 0 mL / NET: 240 mL        PHYSICAL EXAM:  T(C): 37.1 (04-26-23 @ 09:06), Max: 37.1 (04-25-23 @ 16:09)  HR: 82 (04-26-23 @ 09:06) (78 - 84)  BP: 110/74 (04-26-23 @ 09:06) (106/70 - 127/87)  RR: 18 (04-26-23 @ 09:06) (18 - 18)  SpO2: 99% (04-26-23 @ 09:06) (98% - 100%)  CONSTITUTIONAL: NAD, well-developed, well-groomed  EYES: PERRLA; conjunctiva and sclera clear  ENMT: Moist oral mucosa, no pharyngeal injection or exudates; normal dentition  NECK: Supple, no palpable masses; no thyromegaly  RESPIRATORY: Normal respiratory effort; lungs are clear to auscultation bilaterally  CARDIOVASCULAR: Regular rate and rhythm, normal S1 and S2, no murmur/rub/gallop; No lower extremity edema; Peripheral pulses are 2+ bilaterally  ABDOMEN: Nontender to palpation, normoactive bowel sounds, no rebound/guarding; No hepatosplenomegaly  MUSCULOSKELETAL:  Normal gait; no clubbing or cyanosis of digits; no joint swelling or tenderness to palpation  PSYCH: A+O to person, place, and time; affect appropriate  NEUROLOGY: CN 2-12 are intact and symmetric; no gross sensory deficits   SKIN: No rashes; no palpable lesions    LABS:          RADIOLOGY & ADDITIONAL TESTS:    Imaging Personally Reviewed:    Consultant(s) Notes Reviewed:      Care Discussed with Consultants/Other Providers: Ivon

## 2023-04-26 NOTE — PROGRESS NOTE ADULT - ASSESSMENT
42F Hx HTN, obesity s/p gastric sleeve, hysterectomy, LBP since 2022 w/progressive BLE radic, 2023 the patient began having difficulty ambulating 2/2 pain, was started on steroids and sent to rehab->worsening pain, re-scanned w/new T11 lesion s/p biopsy  xfer from [facility] for T11 lesion. OSH path report c/w leimyoma. MR showing svr stenosis @ level of T11 w/enhancing lesion with a high fat content. CT CAP was corrupted.  (2023 17:32)    PROCEDURE:  Adm  T11 Lesion.   POD#NA    PLAN:  Neuro: Ck routine LED.  Path from [facility] reviewewd by [facility] Bone Pathologist=c/w Leiomyoma, FU GYN final recomm. ?Tentitive OR plans for  FU. Inc activity/OOB. GYN/Onco-I contacted Dr Venus Barriga on Teams re starting Lupron will FU on pt with recomm.    Medicine note from - T11 lesion s/p biopsy- xfer from [facility] for T11 lesion. OSH path report c/w leimyoma. MR showing svr stenosis @ level of T11 w/enhancing lesion with a high fat content. Metastatic work up with wall thickening concern for esophageal mass, right hepatic lobe lesion measures 1.4 cm &  Bilateral adrenal nodules measuring 1.8 cm on the left and 1.1 cm on the right. Pt currently in bed, c/o LE weakness, denies pain.  Problem/Plan - 1:·  Problem: Neoplasm of vertebral column. ·  Plan: Pt w morbid obesity with a BMI ~45, multiple prior fibroids requiring extensive gynecological procedures culminating in hysterectomy, p/w increasing LBP with radiation down legs and difficulty ambulating. Started last summer in 2022. Increased in 2023 requiring hospital admission. Work-up revealed an intraosseous mass at T11 with epidural extension and high-grade cord compression. Reportedly also had some lesions in the lumbar spine as well. Lumbar spine lesion biopsied, c/w leiomyoma. T11 lesion also biopsied as well which was also leiomyoma. Was offered surgery but transferred over to NS due to concern about possible underlying malignancy. MRI T-spine w/wo shows T11 lesion that appears to have significant epidural extension with cord compression. Metastatic workup as above. Pt does not want an EGD this time for evaluation of the esophageal findings. MRI abdomen on - A 1.8 cm right hepatic lobe hemangioma. Bilateral adrenal adenomas. Redemonstrationof T11 vertebral body mass with soft tissue invasion of the spinal canal. Two subcutaneous masses involving thelower back/upper buttock and ventral abdominal wall as detailed . Pt received 1 dose of Lupron a month back. Per Gyn- await path results before continuing.  Problem/Plan - 2:·  Problem: Encounter for preoperative assessment. ·  Plan: Potential plan for surgery. Pt has low cardiac risk predictors and is medically optimized. Problem/Plan - 3:·  Problem: Hypertension.   ·  Plan: Per pt, no prior history. Was recently diagnosed during [facility] admission. Continue Procardia XL. Problem/Plan - 4:·  Problem: Prophylactic measure. ·  Plan: Lovenox for DVT prophylaxis. H/o asthma- no current exbn. Prn Albuterol. Has no PMD.    GYN/Onco note of -41 y/o  LMP 2015 s/p hysterectomy for fibroids, transferred from [facility] for further evaluation of T11 lesion with tissue biopsy of leiomyoma.  #T11 lesions- No acute GYN Onc intervention; need for further records and workup - Recommend obtaining [facility] biopsy pathology results to confirm leiomyoma, operative and pathology reports from [facility] for hysterectomy- Additional recommendations pending pathology report and additional records review Patient seen and discussed with fellow PGY7 Gus Culver PGY-2, GYN ONC Fellow Addendum:41 yo with history of fibroid uterus requiring previous myomectomies and emergent hysterectomy who developed back pain and lower extremity weakness last Summer initially treated in an OSH ER with pain medications. She was found to have bony lesion which were biopsied at [facility] and found to be leiomyoma on final pathology. She was given one dose of Lupron and sent to rehab due to mobility impairment. She had another subsequent fall and was brought back to OSH. T11 lesion biopsied at this time with same result. Patient then transferred to [facility]. She reports being diagnosed with fibroids shortly after the onset of menarche. She subsequently underwent multiple myomectomies. She was never treated with oral medications or an IUD. She reports her maternal grandmother was diagnosed with uterine cancer, unsure of exact type, at an older age. She denies family history of ovarian, breast, colon, pancreatic, renal cancers. Discussed with patient that fibroids can disseminate to other areas of the body. This is most commonly seen with IV leiomyomatosis, in which they follow/invade the pelvic vasculature. This can also result in dissemination to the lungs. There are genetic and familial predispositions that can result in dissemination of fibroids. Review of her family and medical history make this unlikely. We discussed that the location of her lesions is not characteristic of leiomyoma. - Path slides requested by primary team. Will follow up read by [facility] GYN Path- Please obtain operative report and pathology report from [facility] for hysterectomy- EGD for evaluation of esophageal mass- A trial of lupron may help reduce the size of the lesions if path review reveals leiomyoma. Final recs pending review.     Hemo/Onco note of -#Thoracic Vertebral Lesion Patient has had progressively worsening lower back pain since 2022. Initially presented as lower back pain but developed bilateral lower extremity radiculopathy and difficulty ambulating. She was recently found to have a T11 vertebral lesion that was biopsied at [facility]. That biopsy reportedly showed leiomyoma. - Slides have been requested from [facility] to be reviewed by a pathologist here. - CT chest/abd/pelvis 23 shows wall thickening of the mid esophagus, concerning for underlying mass. Indeterminate lesions in the right hepatic lobe and bilateral adrenal glands, which may be further evaluated with contrast-enhanced abdominal MRI. Subcentimeter nodules in the left lung. Lytic lesion in the T11 vertebral body corresponding with lesion described by clinical history.  - Would prefer that patient have EGD with biopsy of esophageal thickening noted, but patient declining at this time as she has already had two biopsies and we are still waiting for the slides to be delivered and reviewed. Those two biopsies were of the primary site, so still would need to know if there are any metastatic lesions. - Neurosurgery planning for spinal procedure and will obtain tissue - Recommend MRI abdomen w/ IV contrast to further evaluate the liver - Outpatient oncologist Dr. Marito Akbar had been contacted about her case. Will continue to keep Dr. Akbar updated regarding patient's hospital course .    GI note of 4/19-#mid-esophageal wall thickening ?mass ?stricture ?esophagitis -Reviewed with pt indications, risks, benefits of EGD to evaluate CT findings and offered EGD for endoscopic evaluation and biopsy. She is refusing any GI procedure and further biopsies. -Clinically tolerating PO, can continue. Monitor PO tolerance -continue PPI (known GERD and s/p gastric sleeve anatomy, recent steroids) -emotional support given; consider psych input    Respiratory: Patient instructed to use incentive spirometer [ X] YES [ ] NO              DVT ppx: [X ] SQL [ ] SQH and Venodynes [ ] Left [ ] Right [ X] Bilateral    Discharge Planning:  The patient was evaluated by PT and recommended Home PT at this time, will need FU if any surgery done.   She was subsequently DC on >>> in stable condition.    More than 30 minutes spent on total encounter: more than 50% of the visit was spent on educating the patient and family regarding condition, medications, follow up plans, signs and symptoms to be concerned with, preparing paperwork, and questions answered regarding discharge.

## 2023-04-26 NOTE — PROGRESS NOTE ADULT - SUBJECTIVE AND OBJECTIVE BOX
SUBJECTIVE: This is my initial visit with this pt. Doing well, some numbness b/l foot bottom. NAD    OVERNIGHT EVENTS: None    Vital Signs Last 24 Hrs  T(C): 37.1 (26 Apr 2023 09:06), Max: 37.1 (25 Apr 2023 16:09)  T(F): 98.7 (26 Apr 2023 09:06), Max: 98.8 (25 Apr 2023 16:09)  HR: 82 (26 Apr 2023 09:06) (78 - 84)  BP: 110/74 (26 Apr 2023 09:06) (106/70 - 127/87)  BP(mean): --  RR: 18 (26 Apr 2023 09:06) (18 - 18)  SpO2: 99% (26 Apr 2023 09:06) (98% - 100%)    Parameters below as of 26 Apr 2023 09:06  Patient On (Oxygen Delivery Method): room air      IVF: [ X] IVL [ ] NS+K@   DIET: [X ] Regular [ ] CCD [ ] Renal [ ] Puree [ ] Dysphagia [ ] Tube Feeds:   PCA: [ ] YES [ X] NO   RODRIGUEZ: [ ] YES [X ] NO [X ] VOID   BM: [ X] YES-on 4/22    DRAINS: None    PHYSICAL EXAM:    Constitutional: No Acute Distress     Neurological: AAOx3, Following Commands, Moving all Extremities     Motor exam:          Upper extremity                         Delt     Bicep     Tricep    HG                                                 R         5/5        5/5        5/5       5/5                                               L          5/5        5/5        5/5       5/5          Lower extremity                        HF         KF        KE       DF         PF                                                  R        5/5        5/5        5/5       5/5         5/5                                               L         5/5        5/5       5/5       5/5          5/5                                                 Sensation: [X] intact to light touch  [] decreased:     Pulmonary: Clear to Auscultation, No rales, No rhonchi, No wheezes     Cardiovascular: S1, S2, Regular rate and rhythm     Gastrointestinal: Soft, Non-tender, Non-distended     Extremities: No calf tenderness     Incision: NA    LABS:    MRSA/MSSA PCR (04.18.23 @ 19:39)  MRSA PCR Result.: NotDetec  Staph aureus PCR Result: NotDetec    IMAGING:  < from: MR Abdomen w/ IV Cont (04.21.23 @ 20:59) >  1.  A 1.8 cm right hepatic lobe hemangioma. Bilateral adrenal adenomas.  2.  Redemonstration of T11 vertebral body mass with soft tissue invasion   of the spinal canal.  3.  Two subcutaneous masses involving the lower back/upper buttock and   ventral abdominal wall as detailed above.    < end of copied text >  < from: CT Abdomen and Pelvis w/ IV Cont (04.19.23 @ 11:51) >    IMPRESSION:  *  Wall thickening of the mid esophagus, concerning for underlying mass.   Consider further evaluation with endoscopy.  *  Indeterminate lesions in the right hepatic lobe and bilateral adrenal   glands, which may be further evaluated with contrast-enhanced abdominal   MRI.  *  Subcentimeter nodules in the left lung.  *  Lytic lesion in the T11 vertebral body corresponding with lesion   described by clinical history.    < end of copied text >  < from: CT Lumbar Spine No Cont (04.19.23 @ 11:50) >  Markedly coarsened osseous texture of the entire T11 vertebral body that   extends into the bilateral pedicles. There is soft tissue filling the   left neural foramen at T11/T12 and to a lesser degree within the left   T10/T11 neural foramen, unknown if this is part of the same bony process   versus disc herniations, limited by CT technique. MRI may be helpful for   further evaluation as clinically indicated.    No additional suspicious lesions of the cervical, thoracic, lumbar spine.    < end of copied text >    MEDICATIONS  (STANDING):  enoxaparin Injectable 40 milliGRAM(s) SubCutaneous <User Schedule>  multivitamin 1 Tablet(s) Oral daily  NIFEdipine XL 60 milliGRAM(s) Oral daily  pantoprazole    Tablet 40 milliGRAM(s) Oral before breakfast  senna 2 Tablet(s) Oral at bedtime    MEDICATIONS  (PRN):  acetaminophen     Tablet .. 650 milliGRAM(s) Oral every 6 hours PRN Temp greater or equal to 38C (100.4F), Mild Pain (1 - 3)  albuterol    90 MICROgram(s) HFA Inhaler 2 Puff(s) Inhalation every 6 hours PRN for shortness of breath and/or wheezing  bisacodyl 5 milliGRAM(s) Oral daily PRN Constipation  methocarbamol 500 milliGRAM(s) Oral every 8 hours PRN Muscle Spasm  polyethylene glycol 3350 17 Gram(s) Oral daily PRN Constipation

## 2023-04-27 LAB
ANION GAP SERPL CALC-SCNC: 12 MMOL/L — SIGNIFICANT CHANGE UP (ref 5–17)
BLD GP AB SCN SERPL QL: NEGATIVE — SIGNIFICANT CHANGE UP
BUN SERPL-MCNC: 9 MG/DL — SIGNIFICANT CHANGE UP (ref 7–23)
CALCIUM SERPL-MCNC: 9.3 MG/DL — SIGNIFICANT CHANGE UP (ref 8.4–10.5)
CHLORIDE SERPL-SCNC: 100 MMOL/L — SIGNIFICANT CHANGE UP (ref 96–108)
CO2 SERPL-SCNC: 27 MMOL/L — SIGNIFICANT CHANGE UP (ref 22–31)
CREAT SERPL-MCNC: 0.51 MG/DL — SIGNIFICANT CHANGE UP (ref 0.5–1.3)
EGFR: 119 ML/MIN/1.73M2 — SIGNIFICANT CHANGE UP
GLUCOSE SERPL-MCNC: 84 MG/DL — SIGNIFICANT CHANGE UP (ref 70–99)
HCT VFR BLD CALC: 38 % — SIGNIFICANT CHANGE UP (ref 34.5–45)
HGB BLD-MCNC: 12.2 G/DL — SIGNIFICANT CHANGE UP (ref 11.5–15.5)
MCHC RBC-ENTMCNC: 29.1 PG — SIGNIFICANT CHANGE UP (ref 27–34)
MCHC RBC-ENTMCNC: 32.1 GM/DL — SIGNIFICANT CHANGE UP (ref 32–36)
MCV RBC AUTO: 90.7 FL — SIGNIFICANT CHANGE UP (ref 80–100)
NRBC # BLD: 0 /100 WBCS — SIGNIFICANT CHANGE UP (ref 0–0)
PLATELET # BLD AUTO: 271 K/UL — SIGNIFICANT CHANGE UP (ref 150–400)
POTASSIUM SERPL-MCNC: 3.8 MMOL/L — SIGNIFICANT CHANGE UP (ref 3.5–5.3)
POTASSIUM SERPL-SCNC: 3.8 MMOL/L — SIGNIFICANT CHANGE UP (ref 3.5–5.3)
RBC # BLD: 4.19 M/UL — SIGNIFICANT CHANGE UP (ref 3.8–5.2)
RBC # FLD: 13.7 % — SIGNIFICANT CHANGE UP (ref 10.3–14.5)
RH IG SCN BLD-IMP: POSITIVE — SIGNIFICANT CHANGE UP
SARS-COV-2 RNA SPEC QL NAA+PROBE: SIGNIFICANT CHANGE UP
SODIUM SERPL-SCNC: 139 MMOL/L — SIGNIFICANT CHANGE UP (ref 135–145)
WBC # BLD: 7.14 K/UL — SIGNIFICANT CHANGE UP (ref 3.8–10.5)
WBC # FLD AUTO: 7.14 K/UL — SIGNIFICANT CHANGE UP (ref 3.8–10.5)

## 2023-04-27 PROCEDURE — 99232 SBSQ HOSP IP/OBS MODERATE 35: CPT | Mod: GC

## 2023-04-27 PROCEDURE — 99221 1ST HOSP IP/OBS SF/LOW 40: CPT

## 2023-04-27 PROCEDURE — 99231 SBSQ HOSP IP/OBS SF/LOW 25: CPT

## 2023-04-27 PROCEDURE — 99232 SBSQ HOSP IP/OBS MODERATE 35: CPT

## 2023-04-27 RX ADMIN — Medication 650 MILLIGRAM(S): at 21:55

## 2023-04-27 RX ADMIN — POLYETHYLENE GLYCOL 3350 17 GRAM(S): 17 POWDER, FOR SOLUTION ORAL at 18:40

## 2023-04-27 RX ADMIN — Medication 650 MILLIGRAM(S): at 06:35

## 2023-04-27 RX ADMIN — PANTOPRAZOLE SODIUM 40 MILLIGRAM(S): 20 TABLET, DELAYED RELEASE ORAL at 06:34

## 2023-04-27 RX ADMIN — Medication 650 MILLIGRAM(S): at 22:55

## 2023-04-27 RX ADMIN — Medication 650 MILLIGRAM(S): at 07:05

## 2023-04-27 RX ADMIN — SENNA PLUS 2 TABLET(S): 8.6 TABLET ORAL at 21:55

## 2023-04-27 RX ADMIN — Medication 1 TABLET(S): at 13:31

## 2023-04-27 RX ADMIN — POLYETHYLENE GLYCOL 3350 17 GRAM(S): 17 POWDER, FOR SOLUTION ORAL at 06:34

## 2023-04-27 NOTE — PROGRESS NOTE ADULT - ATTENDING COMMENTS
both T11 bone biopsy and adjacent soft tissue biopsy demonstrated leiomyoma   no plan for further surgery  follow up with Gyn

## 2023-04-27 NOTE — PROGRESS NOTE ADULT - SUBJECTIVE AND OBJECTIVE BOX
Patient seen and examined, stable exam. BLE 4+ to 5/5. 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 Will defer to gyn onc re: final recs but will attempt estrogen deprivation to see if we can non-operatively reduce the disease burden causing cord compression at T11. Patient in agreement with the plan.    I spent over 15 minutes at the bedside evaluating the patient and coordinating her care.

## 2023-04-27 NOTE — PROGRESS NOTE ADULT - SUBJECTIVE AND OBJECTIVE BOX
SUBJECTIVE: Pt seen with Dr Anaya at bedside this AM.  Doing well, some numbness b/l foot bottom. NAD    Vital Signs Last 24 Hrs  T(C): 36.7 (27 Apr 2023 10:48), Max: 36.8 (26 Apr 2023 21:01)  T(F): 98 (27 Apr 2023 10:48), Max: 98.2 (26 Apr 2023 21:01)  HR: 79 (27 Apr 2023 10:48) (76 - 89)  BP: 122/86 (27 Apr 2023 10:48) (107/74 - 122/86)  BP(mean): --  RR: 18 (27 Apr 2023 10:48) (18 - 18)  SpO2: 99% (27 Apr 2023 10:48) (98% - 100%)    Parameters below as of 27 Apr 2023 05:14  Patient On (Oxygen Delivery Method): room air    IVF: [ X] IVL [ ] NS+K@   DIET: [X ] Regular [ ] CCD [ ] Renal [ ] Puree [ ] Dysphagia [ ] Tube Feeds:   PCA: [ ] YES [ X] NO   RODRIGUEZ: [ ] YES [X ] NO [X ] VOID Not saved  BM: [ X] YES-on 4/22    DRAINS: None    PHYSICAL EXAM:    Constitutional: No Acute Distress     Neurological: No interval change. AAOx3, Following Commands, Moving all Extremities     Motor exam:          Upper extremity                         Delt     Bicep     Tricep    HG                                                 R         5/5        5/5        5/5       5/5                                               L          5/5        5/5        5/5       5/5          Lower extremity                        HF         KF        KE       DF         PF                                                  R        5/5        5/5        5/5       5/5         5/5                                               L         5/5        5/5       5/5       5/5          5/5                                                 Sensation: [X] intact to light touch  [] decreased:     Pulmonary: Clear to Auscultation, No rales, No rhonchi, No wheezes     Cardiovascular: S1, S2, Regular rate and rhythm     Gastrointestinal: Soft, Non-tender, Non-distended     Extremities: No calf tenderness     Incision: NA    LABS:                        12.2   7.14  )-----------( 271      ( 27 Apr 2023 06:54 )             38.0   04-27    139  |  100  |  9   ----------------------------<  84  3.8   |  27  |  0.51    Ca    9.3      27 Apr 2023 06:54    COVID-19 PCR: NotDetec (27 Apr 2023 07:16)    MRSA/MSSA PCR (04.18.23 @ 19:39)  MRSA PCR Result.: NotDetec  Staph aureus PCR Result: NotDetec    IMAGING:  < from: [facility] Duplex Lower Ext Vein Scan, Bilat (04.26.23 @ 16:08) >  No evidence of deep venous thrombosis in either lower extremity.    < end of copied text >    < from: MR Abdomen w/ IV Cont (04.21.23 @ 20:59) >  1.  A 1.8 cm right hepatic lobe hemangioma. Bilateral adrenal adenomas.  2.  Redemonstration of T11 vertebral body mass with soft tissue invasion   of the spinal canal.  3.  Two subcutaneous masses involving the lower back/upper buttock and   ventral abdominal wall as detailed above.    < end of copied text >  < from: CT Abdomen and Pelvis w/ IV Cont (04.19.23 @ 11:51) >    IMPRESSION:  *  Wall thickening of the mid esophagus, concerning for underlying mass.   Consider further evaluation with endoscopy.  *  Indeterminate lesions in the right hepatic lobe and bilateral adrenal   glands, which may be further evaluated with contrast-enhanced abdominal   MRI.  *  Subcentimeter nodules in the left lung.  *  Lytic lesion in the T11 vertebral body corresponding with lesion   described by clinical history.    < end of copied text >  < from: CT Lumbar Spine No Cont (04.19.23 @ 11:50) >  Markedly coarsened osseous texture of the entire T11 vertebral body that   extends into the bilateral pedicles. There is soft tissue filling the   left neural foramen at T11/T12 and to a lesser degree within the left   T10/T11 neural foramen, unknown if this is part of the same bony process   versus disc herniations, limited by CT technique. MRI may be helpful for   further evaluation as clinically indicated.    No additional suspicious lesions of the cervical, thoracic, lumbar spine.    < end of copied text >    MEDICATIONS  (STANDING):  enoxaparin Injectable 40 milliGRAM(s) SubCutaneous <User Schedule>  multivitamin 1 Tablet(s) Oral daily  NIFEdipine XL 30 milliGRAM(s) Oral daily  pantoprazole    Tablet 40 milliGRAM(s) Oral before breakfast  polyethylene glycol 3350 17 Gram(s) Oral two times a day  senna 2 Tablet(s) Oral at bedtime    MEDICATIONS  (PRN):  acetaminophen     Tablet .. 650 milliGRAM(s) Oral every 6 hours PRN Temp greater or equal to 38C (100.4F), Mild Pain (1 - 3)  albuterol    90 MICROgram(s) HFA Inhaler 2 Puff(s) Inhalation every 6 hours PRN for shortness of breath and/or wheezing  bisacodyl 5 milliGRAM(s) Oral daily PRN Constipation  methocarbamol 500 milliGRAM(s) Oral every 8 hours PRN Muscle Spasm

## 2023-04-27 NOTE — CHART NOTE - NSCHARTNOTEFT_GEN_A_CORE
Seen in consultation. See prior consult note.   Met with pt and her mother. Disc her prior care at [facility].  Disc path review and ER/OR pos, and its implications.  All Q/A  Reviewed plan of care, Lupron. She questions another "shot"? In d/w AF, Gyn Onc fellow, he notes use of an AI, which is a PO med.  Have asked [facility] Gyn team to order pt's second dose of Lupron. AI can be prescribed by her primary gyn team at [facility], where she will continue to receive care.  Please contact team with additional questions.
discussed with Neurosurgery  tolerating PO  awaiting pathology slides review from [facility] (outside hospital)  still refusing any endoscopy    Please recall prn for acute GI concerns and if pt reconsiders endoscopy  Thank You.    Bulmaro Bragg PA-C    [facility]  [phone]  Available on TEAMS Mon-Fri 8a-4p  After hours and weekend coverage [phone]
CAPRINI SCORE [CLOT] Score on Admission for     AGE RELATED RISK FACTORS                                                       MOBILITY RELATED FACTORS  [ x] Age 41-60 years                                            (1 Point)                  [ ] Bed rest                                                        (1 Point)  [ ] Age: 61-74 years                                           (2 Points)                 [ ] Plaster cast                                                   (2 Points)  [ ] Age= 75 years                                              (3 Points)                 [ ] Bed bound for more than 72 hours                 (2 Points)    DISEASE RELATED RISK FACTORS                                               GENDER SPECIFIC FACTORS  [ ] Edema in the lower extremities                       (1 Point)                  [ ] Pregnancy                                                     (1 Point)  [ ] Varicose veins                                               (1 Point)                  [ ] Post-partum < 6 weeks                                   (1 Point)             [x ] BMI > 25 Kg/m2                                            (1 Point)                  [ ] Hormonal therapy  or oral contraception          (1 Point)                 [ ] Sepsis (in the previous month)                        (1 Point)                  [ ] History of pregnancy complications                 (1 point)  [ ] Pneumonia or serious lung disease                                               [ ] Unexplained or recurrent                     (1 Point)           (in the previous month)                               (1 Point)  [ ] Abnormal pulmonary function test                     (1 Point)                 SURGERY RELATED RISK FACTORS (include planned surgeries)  [ ] Acute myocardial infarction                              (1 Point)                 [ ]  Section                                             (1 Point)  [ ] Congestive heart failure (in the previous month)  (1 Point)         [ ] Minor surgery                                                  (1 Point)   [ ] Inflammatory bowel disease                             (1 Point)                 [ ] Arthroscopic surgery                                        (2 Points)  [ ] Central venous access                                      (2 Points)                [ ] General surgery lasting more than 45 minutes   (2 Points)       [ ] Stroke (in the previous month)                          (5 Points)               [ ] Elective arthroplasty                                         (5 Points)            [ x] current or past malignancy                              (2 Points)                                                                                                       HEMATOLOGY RELATED FACTORS                                                 TRAUMA RELATED RISK FACTORS  [ ] Prior episodes of VTE                                     (3 Points)                [ ] Fracture of the hip, pelvis, or leg                       (5 Points)  [ ] Positive family history for VTE                         (3 Points)                 [ ] Acute spinal cord injury (in the previous month)  (5 Points)  [ ] Prothrombin 83919 A                                     (3 Points)                 [ ] Paralysis  (less than 1 month)                             (5 Points)  [ ] Factor V Leiden                                             (3 Points)                  [ ] Multiple Trauma within 1 month                        (5 Points)  [ ] Lupus anticoagulants                                     (3 Points)                                                           [ ] Anticardiolipin antibodies                               (3 Points)                                                       [ ] High homocysteine in the blood                      (3 Points)                                             [ ] Other congenital or acquired thrombophilia      (3 Points)                                                [ ] Heparin induced thrombocytopenia                  (3 Points)                                          Total Score [    4      ]    Risk:  Very low 0   Low 1 to 2   Moderate 3 to 4   High =5       VTE Prophylasix Recommednations:  [ ] mechanical pneumatic compression devices                                      [ ] contraindicated: _____________________  [ ] chemo prophylasix                                                                                   [ ] contraindicated _____________________    **** HIGH LIKELIHOOD DVT PRESENT ON ADMISSION  [x ] (please order LE dopplers within 24 hours of admission)

## 2023-04-27 NOTE — PROGRESS NOTE ADULT - ASSESSMENT
42F hx HTN, obesity s/p gastric sleeve, hysterectomy, LBP since 08/2022 w/progressive BLE radic, 02/2023 the patient began having difficulty ambulating 2/2 pain, was started on steroids and sent to rehab->worsening pain, re-scanned w/new T11 lesion s/p biopsy transferred from [facility] for T11 lesion.    #Thoracic Vertebral Lesion  Patient has had progressively worsening lower back pain since August 2022. Initially presented as lower back pain but developed bilateral lower extremity radiculopathy and difficulty ambulating. She was recently found to have a T11 vertebral lesion that was biopsied at [facility]. That biopsy reportedly showed leiomyoma.  - CT chest/abd/pelvis 4/19/23 shows wall thickening of the mid esophagus, concerning for underlying mass. Indeterminate lesions in the right hepatic lobe and bilateral adrenal glands, which may be further evaluated with contrast-enhanced abdominal MRI. Subcentimeter nodules in the left lung. Lytic lesion in the T11 vertebral body corresponding with lesion described by clinical history.  - MRI abdomen w/ IV contrast shows liver lesion is like a hemangioma  - Slides from [facility] show that both the soft tissue and T11 bony biopsies are benign leiomyomas.  - Neurosurgery now managing conservatively  - Lupron injections as per GYN  - Esophageal thickening is still suspicious and would recommend eventual evaluation via EGD and biopsy. Can be done outpatient.  - Oncology will sign off, please re-consult as needed    Note is not finalized until signed by attending.     Rehan Desir MD  Hematology/Oncology Fellow PGY-4  Pager: [facility] [phone] / [place] [phone]  After 5pm and on weekends please page on-call fellow  42F hx HTN, obesity s/p gastric sleeve, hysterectomy, LBP since 08/2022 w/progressive BLE radic, 02/2023 the patient began having difficulty ambulating 2/2 pain, was started on steroids and sent to rehab->worsening pain, re-scanned w/new T11 lesion s/p biopsy transferred from [facility] for T11 lesion.    #Thoracic Vertebral Lesion  Patient has had progressively worsening lower back pain since August 2022. Initially presented as lower back pain but developed bilateral lower extremity radiculopathy and difficulty ambulating. She was recently found to have a T11 vertebral lesion that was biopsied at [facility]. That biopsy reportedly showed leiomyoma.  - CT chest/abd/pelvis 4/19/23 shows wall thickening of the mid esophagus, concerning for underlying mass. Indeterminate lesions in the right hepatic lobe and bilateral adrenal glands, which may be further evaluated with contrast-enhanced abdominal MRI. Subcentimeter nodules in the left lung. Lytic lesion in the T11 vertebral body corresponding with lesion described by clinical history.  - MRI abdomen w/ IV contrast shows liver lesion is like a hemangioma  - Slides from [facility] show that both the soft tissue and T11 bony biopsies are benign leiomyomas.  - Neurosurgery now managing conservatively  - Lupron injections as per GYN  - Esophageal thickening is still suspicious and would recommend eventual evaluation via EGD and biopsy. Can be done outpatient.  - No need for any oncology outpatient follow up at this time. If anything changes, she can be referred to the [facility] outpatient.  - Oncology will sign off, please re-consult as needed    Note is not finalized until signed by attending.     Rehan Desir MD  Hematology/Oncology Fellow PGY-4  Pager: [facility] [phone] / [place] [phone]  After 5pm and on weekends please page on-call fellow  42F hx HTN, obesity s/p gastric sleeve, hysterectomy, LBP since 08/2022 w/progressive BLE radic, 02/2023 the patient began having difficulty ambulating 2/2 pain, was started on steroids and sent to rehab->worsening pain, re-scanned w/new T11 lesion s/p biopsy transferred from [facility] for T11 lesion.    #Thoracic Vertebral Lesion  Patient has had progressively worsening lower back pain since August 2022. Initially presented as lower back pain but developed bilateral lower extremity radiculopathy and difficulty ambulating. She was recently found to have a T11 vertebral lesion that was biopsied at [facility]. That biopsy reportedly showed leiomyoma.  - CT chest/abd/pelvis 4/19/23 shows wall thickening of the mid esophagus, concerning for underlying mass. Indeterminate lesions in the right hepatic lobe and bilateral adrenal glands, which may be further evaluated with contrast-enhanced abdominal MRI. Subcentimeter nodules in the left lung. Lytic lesion in the T11 vertebral body corresponding with lesion described by clinical history.  - MRI abdomen w/ IV contrast shows liver lesion is like a hemangioma  - Slides from [facility] show that both the soft tissue and T11 bony biopsies are benign leiomyomas.  - Neurosurgery now managing conservatively  - Lupron injections as per GYN  - Esophageal thickening is still suspicious and would recommend eventual evaluation via EGD and biopsy. Can be done outpatient. Patient acknowledged and noted that she will get an EGD outpatient.  - No need for any oncology outpatient follow up at this time. If anything changes, she can be referred to the [facility] outpatient.  - Oncology will sign off, please re-consult as needed    Note is not finalized until signed by attending.     Rehan Desir MD  Hematology/Oncology Fellow PGY-4  Pager: [facility] [phone] / [place] [phone]  After 5pm and on weekends please page on-call fellow

## 2023-04-27 NOTE — PROGRESS NOTE ADULT - SUBJECTIVE AND OBJECTIVE BOX
Oncology Follow-up    INTERVAL HPI/OVERNIGHT EVENTS:  Patient S&E at bedside. No o/n events, patient resting comfortably. No complaints at this time. Patient denies fever, chills, dizziness, weakness, CP, palpitations, SOB, cough, N/V/D/C, dysuria, changes in bowel movements, LE edema.    VITAL SIGNS:  T(F): 98 (04-27-23 @ 10:48)  HR: 79 (04-27-23 @ 10:48)  BP: 122/86 (04-27-23 @ 10:48)  RR: 18 (04-27-23 @ 10:48)  SpO2: 99% (04-27-23 @ 10:48)  Wt(kg): --    PHYSICAL EXAM:    Constitutional: AAOx3, NAD,   Eyes: PERRL, EOMI, sclera non-icteric  Neck: supple, no masses, no JVD  Respiratory: CTA b/l, good air entry b/l, no wheezing, rhonchi, rales, with normal respiratory effort and no intercostal retractions  Cardiovascular: RRR, normal S1S2, no M/R/G  Gastrointestinal: soft, NTND, no masses palpable, BS normal in all four quadrants, no HSM  Extremities:  no c/c/e  Neurological: Grossly intact  Skin: Normal temperature    MEDICATIONS  (STANDING):  chlorhexidine 4% Liquid 1 Application(s) Topical once  enoxaparin Injectable 40 milliGRAM(s) SubCutaneous <User Schedule>  multivitamin 1 Tablet(s) Oral daily  NIFEdipine XL 30 milliGRAM(s) Oral daily  pantoprazole    Tablet 40 milliGRAM(s) Oral before breakfast  polyethylene glycol 3350 17 Gram(s) Oral two times a day  senna 2 Tablet(s) Oral at bedtime    MEDICATIONS  (PRN):  acetaminophen     Tablet .. 650 milliGRAM(s) Oral every 6 hours PRN Temp greater or equal to 38C (100.4F), Mild Pain (1 - 3)  albuterol    90 MICROgram(s) HFA Inhaler 2 Puff(s) Inhalation every 6 hours PRN for shortness of breath and/or wheezing  bisacodyl 5 milliGRAM(s) Oral daily PRN Constipation  methocarbamol 500 milliGRAM(s) Oral every 8 hours PRN Muscle Spasm      No Known Allergies      LABS:                        12.2   7.14  )-----------( 271      ( 27 Apr 2023 06:54 )             38.0     04-27    139  |  100  |  9   ----------------------------<  84  3.8   |  27  |  0.51    Ca    9.3      27 Apr 2023 06:54             RADIOLOGY & ADDITIONAL TESTS:  Studies reviewed.   Oncology Follow-up    INTERVAL HPI/OVERNIGHT EVENTS:  Patient S&E at bedside. No o/n events, patient resting comfortably. No complaints at this time. Patient denies fever, chills, dizziness, weakness, CP, palpitations, SOB, cough, N/V/D/C, dysuria, changes in bowel movements, LE edema.    VITAL SIGNS:  T(F): 98 (04-27-23 @ 10:48)  HR: 79 (04-27-23 @ 10:48)  BP: 122/86 (04-27-23 @ 10:48)  RR: 18 (04-27-23 @ 10:48)  SpO2: 99% (04-27-23 @ 10:48)  Wt(kg): --    PHYSICAL EXAM:  Constitutional: AAOx3, NAD,   Eyes: PERRL, EOMI, sclera non-icteric  Neck: supple, no masses, no JVD  Respiratory: CTA b/l, good air entry b/l, no wheezing, rhonchi, rales, with normal respiratory effort and no intercostal retractions  Cardiovascular: RRR, normal S1S2, no M/R/G  Gastrointestinal: soft, NTND, no masses palpable, BS normal in all four quadrants, no HSM  Extremities:  no c/c/e  Neurological: Grossly intact  Skin: Normal temperature    MEDICATIONS  (STANDING):  chlorhexidine 4% Liquid 1 Application(s) Topical once  enoxaparin Injectable 40 milliGRAM(s) SubCutaneous <User Schedule>  multivitamin 1 Tablet(s) Oral daily  NIFEdipine XL 30 milliGRAM(s) Oral daily  pantoprazole    Tablet 40 milliGRAM(s) Oral before breakfast  polyethylene glycol 3350 17 Gram(s) Oral two times a day  senna 2 Tablet(s) Oral at bedtime    MEDICATIONS  (PRN):  acetaminophen     Tablet .. 650 milliGRAM(s) Oral every 6 hours PRN Temp greater or equal to 38C (100.4F), Mild Pain (1 - 3)  albuterol    90 MICROgram(s) HFA Inhaler 2 Puff(s) Inhalation every 6 hours PRN for shortness of breath and/or wheezing  bisacodyl 5 milliGRAM(s) Oral daily PRN Constipation  methocarbamol 500 milliGRAM(s) Oral every 8 hours PRN Muscle Spasm      No Known Allergies      LABS:                        12.2   7.14  )-----------( 271      ( 27 Apr 2023 06:54 )             38.0     04-27    139  |  100  |  9   ----------------------------<  84  3.8   |  27  |  0.51    Ca    9.3      27 Apr 2023 06:54             RADIOLOGY & ADDITIONAL TESTS:  Studies reviewed.

## 2023-04-27 NOTE — PROGRESS NOTE ADULT - ASSESSMENT
42F Hx HTN, obesity s/p gastric sleeve, hysterectomy, LBP since 2022 w/progressive BLE radic, 2023 the patient began having difficulty ambulating 2/2 pain, was started on steroids and sent to rehab->worsening pain, re-scanned w/new T11 lesion s/p biopsy  xfer from [facility] for T11 lesion. OSH path report c/w leimyoma. MR showing svr stenosis @ level of T11 w/enhancing lesion with a high fat content. CT CAP was corrupted.  (2023 17:32)    PROCEDURE:  Adm  T11 Lesion.   POD#NA    PLAN:  Neuro:  Path from [facility] reviewewd by [facility] Bone Pathologist=c/w Leiomyoma, as d/w Dr Anaya today-no surgery plan, awaiting GYN/ONCO input to start Lupron +/- Aromatase Inhibitor and see how it goes.  Med Record at [facility] Tel# [phone] and Fax [phone] contacted to send Op reports of  and , HIPA consent sent and they will fax reports to [address] [place] [address] [employer]. [facility] activity/OOB.     Hosp note from -42F Hx HTN, morbid obesity s/p gastric sleeve, hysterectomy, LBP since 2022 w/progressive BLE radic, 2023 the patient began having difficulty ambulating 2/2 pain, was started on steroids and sent to rehab->worsening pain, re-scanned w/new T11 lesion s/p biopsy- xfer from [facility] for T11 lesion. OSH path report c/w leimyoma. MR showing svr stenosis @ level of T11 w/enhancing lesion with a high fat content. Metastatic work up with wall thickening concern for esophageal mass, right hepatic lobe lesion measures 1.4 cm &  Bilateral adrenal nodules measuring 1.8 cm on the left and 1.1 cm on the right. Pt currently in bed, c/o LE weakness, denies pain.  Problem/Plan - 1:  ·  Problem: Neoplasm of vertebral column. ·  Plan: Pt w morbid obesity with a BMI ~45, multiple prior fibroids requiring extensive gynecological procedures culminating in hysterectomy, p/w increasing LBP with radiation down legs and difficulty ambulating. Started last summer in 2022. Increased in 2023 requiring hospital admission. Work-up revealed an intraosseous mass at T11 with epidural extension and high-grade cord compression. Reportedly also had some lesions in the lumbar spine as well. Lumbar spine lesion biopsied, c/w leiomyoma. T11 lesion also biopsied as well which was also leiomyoma. Was offered surgery but transferred over to NS due to concern about possible underlying malignancy.   MRI T-spine w/wo shows T11 lesion that appears to have significant epidural extension with cord compression. Metastatic workup as above. Pt does not want an EGD this time for evaluation of the esophageal findings. Slides from [facility] for surgical planning- path w Leiomyoma. Surgery likely end of this week. MRI abdomen on - A 1.8 cm right hepatic lobe hemangioma. Bilateral adrenal adenomas. Redemonstrationof T11 vertebral body mass with soft tissue invasion of the spinal canal. Two subcutaneous masses involving thelower back/upper buttock and ventral abdominal wall as detailed .Pt received 1 dose of Lupron a month back. Gyn input on re-dosing. Problem/Plan - 2:·  Problem: Encounter for preoperative assessment. ·  Plan: Potential plan for surgery. Pt has low cardiac risk predictors and is medically optimized. Problem/Plan - 3:·  Problem: Hypertension. ·  Plan: Per pt, no prior history. Was recently diagnosed during [facility] admission. On Procardia XL. BP has been low normal- dose decreased, holding parameters added. Problem/Plan - 4:  ·  Problem: Prophylactic measure. ·  Plan: Lovenox for DVT prophylaxis. H/o asthma- no current exbn. Prn Albuterol. Has no PMD.    GYN/Onco note of -43 y/o  LMP  s/p hysterectomy for fibroids, transferred from [facility] for further evaluation of T11 lesion with tissue biopsy of leiomyoma.  #T11 lesions- No acute GYN Onc intervention; need for further records and workup - Recommend obtaining [facility] biopsy pathology results to confirm leiomyoma, operative and pathology reports from [facility] for hysterectomy- Additional recommendations pending pathology report and additional records review Patient seen and discussed with fellow PGY7 Gus Culver PGY-2, GYN ONC Fellow Addendum:41 yo with history of fibroid uterus requiring previous myomectomies and emergent hysterectomy who developed back pain and lower extremity weakness last Summer initially treated in an OSH ER with pain medications. She was found to have bony lesion which were biopsied at [facility] and found to be leiomyoma on final pathology. She was given one dose of Lupron and sent to rehab due to mobility impairment. She had another subsequent fall and was brought back to OSH. T11 lesion biopsied at this time with same result. Patient then transferred to [facility]. She reports being diagnosed with fibroids shortly after the onset of menarche. She subsequently underwent multiple myomectomies. She was never treated with oral medications or an IUD. She reports her maternal grandmother was diagnosed with uterine cancer, unsure of exact type, at an older age. She denies family history of ovarian, breast, colon, pancreatic, renal cancers. Discussed with patient that fibroids can disseminate to other areas of the body. This is most commonly seen with IV leiomyomatosis, in which they follow/invade the pelvic vasculature. This can also result in dissemination to the lungs. There are genetic and familial predispositions that can result in dissemination of fibroids. Review of her family and medical history make this unlikely. We discussed that the location of her lesions is not characteristic of leiomyoma. - Path slides requested by primary team. Will follow up read by [facility] GYN Path- Please obtain operative report and pathology report from [facility] for hysterectomy- EGD for evaluation of esophageal mass- A trial of lupron may help reduce the size of the lesions if path review reveals leiomyoma. Final recs pending review.     Hemo/Onco note of -#Thoracic Vertebral Lesion Patient has had progressively worsening lower back pain since 2022. Initially presented as lower back pain but developed bilateral lower extremity radiculopathy and difficulty ambulating. She was recently found to have a T11 vertebral lesion that was biopsied at [facility]. That biopsy reportedly showed leiomyoma. - Slides have been requested from [facility] to be reviewed by a pathologist here. - CT chest/abd/pelvis 23 shows wall thickening of the mid esophagus, concerning for underlying mass. Indeterminate lesions in the right hepatic lobe and bilateral adrenal glands, which may be further evaluated with contrast-enhanced abdominal MRI. Subcentimeter nodules in the left lung. Lytic lesion in the T11 vertebral body corresponding with lesion described by clinical history.  - Would prefer that patient have EGD with biopsy of esophageal thickening noted, but patient declining at this time as she has already had two biopsies and we are still waiting for the slides to be delivered and reviewed. Those two biopsies were of the primary site, so still would need to know if there are any metastatic lesions. - Neurosurgery planning for spinal procedure and will obtain tissue - Recommend MRI abdomen w/ IV contrast to further evaluate the liver - Outpatient oncologist Dr. Marito Akbar had been contacted about her case. Will continue to keep Dr. Akbar updated regarding patient's hospital course .    GI note of -#mid-esophageal wall thickening ?mass ?stricture ?esophagitis -Reviewed with pt indications, risks, benefits of EGD to evaluate CT findings and offered EGD for endoscopic evaluation and biopsy. She is refusing any GI procedure and further biopsies. -Clinically tolerating PO, can continue. Monitor PO tolerance -continue PPI (known GERD and s/p gastric sleeve anatomy, recent steroids) -emotional support given; consider psych input    Respiratory: Patient instructed to use incentive spirometer [ X] YES [ ] NO              DVT ppx: [X ] SQL [ ] SQH and Venodynes [ ] Left [ ] Right [ X] Bilateral    Discharge Planning:  The patient was evaluated by PT and recommended ?Home PT.   She was subsequently DC on >>> in stable condition.    More than 30 minutes spent on total encounter: more than 50% of the visit was spent on educating the patient and family regarding condition, medications, follow up plans, signs and symptoms to be concerned with, preparing paperwork, and questions answered regarding discharge.

## 2023-04-28 ENCOUNTER — APPOINTMENT (OUTPATIENT)
Dept: NEUROSURGERY | Facility: HOSPITAL | Age: 43
End: 2023-04-28

## 2023-04-28 PROCEDURE — 99232 SBSQ HOSP IP/OBS MODERATE 35: CPT

## 2023-04-28 RX ADMIN — PANTOPRAZOLE SODIUM 40 MILLIGRAM(S): 20 TABLET, DELAYED RELEASE ORAL at 05:30

## 2023-04-28 RX ADMIN — Medication 3.75 MILLIGRAM(S): at 14:44

## 2023-04-28 RX ADMIN — Medication 1 TABLET(S): at 13:02

## 2023-04-28 NOTE — PROGRESS NOTE ADULT - SUBJECTIVE AND OBJECTIVE BOX
Patient is a 42y old  Female who presents with a chief complaint of T11 bony tumor (27 Apr 2023 13:57)      SUBJECTIVE / OVERNIGHT EVENTS: Pt up in bed, feels well, denies pain.     MEDICATIONS  (STANDING):  enoxaparin Injectable 40 milliGRAM(s) SubCutaneous <User Schedule>  leuprolide depot Injectable (LUPRON-DEPOT) 3.75 milliGRAM(s) IntraMuscular once  multivitamin 1 Tablet(s) Oral daily  NIFEdipine XL 30 milliGRAM(s) Oral daily  pantoprazole    Tablet 40 milliGRAM(s) Oral before breakfast  polyethylene glycol 3350 17 Gram(s) Oral two times a day  senna 2 Tablet(s) Oral at bedtime    MEDICATIONS  (PRN):  acetaminophen     Tablet .. 650 milliGRAM(s) Oral every 6 hours PRN Temp greater or equal to 38C (100.4F), Mild Pain (1 - 3)  albuterol    90 MICROgram(s) HFA Inhaler 2 Puff(s) Inhalation every 6 hours PRN for shortness of breath and/or wheezing  bisacodyl 5 milliGRAM(s) Oral daily PRN Constipation  methocarbamol 500 milliGRAM(s) Oral every 8 hours PRN Muscle Spasm      CAPILLARY BLOOD GLUCOSE        I&O's Summary    27 Apr 2023 07:01  -  28 Apr 2023 07:00  --------------------------------------------------------  IN: 750 mL / OUT: 0 mL / NET: 750 mL        PHYSICAL EXAM:  T(C): 36.5 (04-28-23 @ 08:30), Max: 37 (04-27-23 @ 13:53)  HR: 84 (04-28-23 @ 08:30) (80 - 92)  BP: 127/85 (04-28-23 @ 08:30) (116/87 - 132/88)  RR: 18 (04-28-23 @ 08:30) (18 - 18)  SpO2: 100% (04-28-23 @ 08:30) (99% - 100%)  CONSTITUTIONAL: NAD, well-developed, well-groomed  EYES: PERRLA; conjunctiva and sclera clear  ENMT: Moist oral mucosa, no pharyngeal injection or exudates; normal dentition  NECK: Supple, no palpable masses; no thyromegaly  RESPIRATORY: Normal respiratory effort; lungs are clear to auscultation bilaterally  CARDIOVASCULAR: Regular rate and rhythm, normal S1 and S2, no murmur/rub/gallop; No lower extremity edema; Peripheral pulses are 2+ bilaterally  ABDOMEN: Nontender to palpation, normoactive bowel sounds, no rebound/guarding; No hepatosplenomegaly  MUSCULOSKELETAL: No clubbing or cyanosis of digits; no joint swelling or tenderness to palpation  PSYCH: A+O to person, place, and time; affect appropriate  NEUROLOGY: CN 2-12 are intact and symmetric; no gross sensory deficits   SKIN: No rashes; no palpable lesions    LABS:                        12.2   7.14  )-----------( 271      ( 27 Apr 2023 06:54 )             38.0     04-27    139  |  100  |  9   ----------------------------<  84  3.8   |  27  |  0.51    Ca    9.3      27 Apr 2023 06:54        RADIOLOGY & ADDITIONAL TESTS:    Imaging Personally Reviewed:    Consultant(s) Notes Reviewed:      Care Discussed with Consultants/Other Providers: Ivon

## 2023-04-28 NOTE — PROGRESS NOTE ADULT - PROBLEM SELECTOR PLAN 4
Lovenox for DVT prophylaxis.     H/o asthma- no current exbn. Prn Albuterol.     Has no PMD.
Lovenox for DVT prophylaxis.     H/o asthma- no current exbn. Prn Albuterol.     Has no PMD- to f/u at [facility].    D/c planning.

## 2023-04-28 NOTE — PROGRESS NOTE ADULT - ASSESSMENT
42F Hx HTN, obesity s/p gastric sleeve, hysterectomy, LBP since 2022 w/progressive BLE radic, 2023 the patient began having difficulty ambulating 2/2 pain, was started on steroids and sent to rehab->worsening pain, re-scanned w/new T11 lesion s/p biopsy  xfer from [facility] for T11 lesion. OSH path report c/w leimyoma. MR showing svr stenosis @ level of T11 w/enhancing lesion with a high fat content. CT CAP was corrupted.  (2023 17:32)    PROCEDURE:  Adm  T11 Lesion.   POD# NA    PLAN:  Neuro:  Path from [facility] reviewewd by [facility] Bone Pathologist=c/w Leiomyoma, as d/w Dr Anaya today-no surgery plan,  GYN/ONCO Ordered Lupron 3.75mg IM x 1 dose, monitor prognosis.  Inc activity/OOB. DC Planning.    GYN/Onco note of -Seen in consultation. See prior consult note. Met with pt and her mother. Disc her prior care at [facility]. Disc path review and ER/ID pos, and its implications.  All Q/A. Reviewed plan of care, Lupron. She questions another "shot"? In d/w AF, Gyn Onc fellow, he notes use of an AI, which is a PO med. Have asked [facility] Gyn team to order pt's second dose of Lupron. AI can be prescribed by her primary gyn team at [facility], where she will continue to receive care. Please contact team with additional questions. Electronic Signatures: Pito Euceda)     Hosp note from -42F Hx HTN, morbid obesity s/p gastric sleeve, hysterectomy, LBP since 2022 w/progressive BLE radic, 2023 the patient began having difficulty ambulating 2/2 pain, was started on steroids and sent to rehab->worsening pain, re-scanned w/new T11 lesion s/p biopsy- xfer from [facility] for T11 lesion. OSH path report c/w leimyoma. MR showing svr stenosis @ level of T11 w/enhancing lesion with a high fat content. Metastatic work up with wall thickening concern for esophageal mass, right hepatic lobe lesion measures 1.4 cm &  Bilateral adrenal nodules measuring 1.8 cm on the left and 1.1 cm on the right. Pt currently in bed, c/o LE weakness, denies pain.  Problem/Plan - 1:  ·  Problem: Neoplasm of vertebral column. ·  Plan: Pt w morbid obesity with a BMI ~45, multiple prior fibroids requiring extensive gynecological procedures culminating in hysterectomy, p/w increasing LBP with radiation down legs and difficulty ambulating. Started last summer in 2022. Increased in 2023 requiring hospital admission. Work-up revealed an intraosseous mass at T11 with epidural extension and high-grade cord compression. Reportedly also had some lesions in the lumbar spine as well. Lumbar spine lesion biopsied, c/w leiomyoma. T11 lesion also biopsied as well which was also leiomyoma. Was offered surgery but transferred over to NS due to concern about possible underlying malignancy.   MRI T-spine w/wo shows T11 lesion that appears to have significant epidural extension with cord compression. Metastatic workup as above. Pt does not want an EGD this time for evaluation of the esophageal findings. Slides from [facility] for surgical planning- path w Leiomyoma. Surgery likely end of this week. MRI abdomen on - A 1.8 cm right hepatic lobe hemangioma. Bilateral adrenal adenomas. Redemonstrationof T11 vertebral body mass with soft tissue invasion of the spinal canal. Two subcutaneous masses involving thelower back/upper buttock and ventral abdominal wall as detailed .Pt received 1 dose of Lupron a month back. Gyn input on re-dosing. Problem/Plan - 2:·  Problem: Encounter for preoperative assessment. ·  Plan: Potential plan for surgery. Pt has low cardiac risk predictors and is medically optimized. Problem/Plan - 3:·  Problem: Hypertension. ·  Plan: Per pt, no prior history. Was recently diagnosed during [facility] admission. On Procardia XL. BP has been low normal- dose decreased, holding parameters added. Problem/Plan - 4:  ·  Problem: Prophylactic measure. ·  Plan: Lovenox for DVT prophylaxis. H/o asthma- no current exbn. Prn Albuterol. Has no PMD.    GYN/Onco note of -43 y/o  LMP 2015 s/p hysterectomy for fibroids, transferred from [facility] for further evaluation of T11 lesion with tissue biopsy of leiomyoma.  #T11 lesions- No acute GYN Onc intervention; need for further records and workup - Recommend obtaining [facility] biopsy pathology results to confirm leiomyoma, operative and pathology reports from [facility] for hysterectomy- Additional recommendations pending pathology report and additional records review Patient seen and discussed with fellow PGY7 Gus Culver PGY-2, GYN ONC Fellow Addendum:43 yo with history of fibroid uterus requiring previous myomectomies and emergent hysterectomy who developed back pain and lower extremity weakness last Summer initially treated in an OSH ER with pain medications. She was found to have bony lesion which were biopsied at [facility] and found to be leiomyoma on final pathology. She was given one dose of Lupron and sent to rehab due to mobility impairment. She had another subsequent fall and was brought back to OSH. T11 lesion biopsied at this time with same result. Patient then transferred to [facility]. She reports being diagnosed with fibroids shortly after the onset of menarche. She subsequently underwent multiple myomectomies. She was never treated with oral medications or an IUD. She reports her maternal grandmother was diagnosed with uterine cancer, unsure of exact type, at an older age. She denies family history of ovarian, breast, colon, pancreatic, renal cancers. Discussed with patient that fibroids can disseminate to other areas of the body. This is most commonly seen with IV leiomyomatosis, in which they follow/invade the pelvic vasculature. This can also result in dissemination to the lungs. There are genetic and familial predispositions that can result in dissemination of fibroids. Review of her family and medical history make this unlikely. We discussed that the location of her lesions is not characteristic of leiomyoma. - Path slides requested by primary team. Will follow up read by [facility] GYN Path- Please obtain operative report and pathology report from [facility] for hysterectomy- EGD for evaluation of esophageal mass- A trial of lupron may help reduce the size of the lesions if path review reveals leiomyoma. Final recs pending review.     Hemo/Onco note of -#Thoracic Vertebral Lesion Patient has had progressively worsening lower back pain since 2022. Initially presented as lower back pain but developed bilateral lower extremity radiculopathy and difficulty ambulating. She was recently found to have a T11 vertebral lesion that was biopsied at [facility]. That biopsy reportedly showed leiomyoma. - Slides have been requested from [facility] to be reviewed by a pathologist here. - CT chest/abd/pelvis 23 shows wall thickening of the mid esophagus, concerning for underlying mass. Indeterminate lesions in the right hepatic lobe and bilateral adrenal glands, which may be further evaluated with contrast-enhanced abdominal MRI. Subcentimeter nodules in the left lung. Lytic lesion in the T11 vertebral body corresponding with lesion described by clinical history.  - Would prefer that patient have EGD with biopsy of esophageal thickening noted, but patient declining at this time as she has already had two biopsies and we are still waiting for the slides to be delivered and reviewed. Those two biopsies were of the primary site, so still would need to know if there are any metastatic lesions. - Neurosurgery planning for spinal procedure and will obtain tissue - Recommend MRI abdomen w/ IV contrast to further evaluate the liver - Outpatient oncologist Dr. Marito Akbar had been contacted about her case. Will continue to keep Dr. Akbar updated regarding patient's hospital course .    GI note of -#mid-esophageal wall thickening ?mass ?stricture ?esophagitis -Reviewed with pt indications, risks, benefits of EGD to evaluate CT findings and offered EGD for endoscopic evaluation and biopsy. She is refusing any GI procedure and further biopsies. -Clinically tolerating PO, can continue. Monitor PO tolerance -continue PPI (known GERD and s/p gastric sleeve anatomy, recent steroids) -emotional support given; consider psych input    Respiratory: Patient instructed to use incentive spirometer [ X] YES [ ] NO              DVT ppx: [X ] SQL [ ] SQH and Venodynes [ ] Left [ ] Right [ X] Bilateral    Discharge Planning:  The patient was evaluated by PT and recommended ?Home PT.   She was subsequently DC on >>> in stable condition.    More than 30 minutes spent on total encounter: more than 50% of the visit was spent on educating the patient and family regarding condition, medications, follow up plans, signs and symptoms to be concerned with, preparing paperwork, and questions answered regarding discharge.

## 2023-04-28 NOTE — PROGRESS NOTE ADULT - TIME BILLING
More than 55 minutes spent on total encounter: more than 50% of the visit was spent on educating the patient and family regarding condition, medications, follow up plans, signs and symptoms to be concerned with, preparing paperwork, and questions answered regarding discharge.
More than 35 minutes spent on total encounter: more than 50% of the visit was spent on educating the patient and family regarding condition, medications, follow up plans, signs and symptoms to be concerned with, preparing paperwork, and questions answered regarding discharge.
More than 35 minutes spent on total encounter: more than 50% of the visit was spent on educating the patient and family regarding condition, medications, follow up plans, signs and symptoms to be concerned with, preparing paperwork, and questions answered regarding discharge.

## 2023-04-28 NOTE — PROGRESS NOTE ADULT - PROBLEM SELECTOR PLAN 1
Pt w morbid obesity with a BMI ~45, multiple prior fibroids requiring extensive gynecological procedures culminating in hysterectomy, p/w increasing LBP with radiation down legs and difficulty ambulating. Started last summer in 8/2022. Increased in 2/2023 requiring hospital admission. Work-up revealed an intraosseous mass at T11 with epidural extension and high-grade cord compression.     Reportedly also had some lesions in the lumbar spine as well. Lumbar spine lesion biopsied, c/w leiomyoma. T11 lesion also biopsied as well which was also leiomyoma. Was offered surgery but transferred over to NS due to concern about possible underlying malignancy.     MRI T-spine w/wo shows T11 lesion that appears to have significant epidural extension with cord compression. Metastatic workup as above. Pt does not want an EGD this time- outpt f/u.     Slides from [facility] for surgical planning- path w Leiomyoma.     MRI abdomen on 4/21- A 1.8 cm right hepatic lobe hemangioma. Bilateral adrenal adenomas. Redemonstrationof T11 vertebral body mass with soft tissue invasion of the spinal canal. Two subcutaneous masses involving the lower back/upper buttock and ventral abdominal wall as detailed .    Now planned for conservative treatment- estrogen deprivation to see if it reduces disease burden causing cord compression at T11. Pt received 1 dose of Lupron a month back. To be re-dosed after d/w Gyn. Pt w multiple prior fibroids requiring extensive gynecological procedures culminating in hysterectomy, p/w increasing LBP with radiation down legs and difficulty ambulating, work-up revealed an intraosseous mass at T11 with epidural extension and high-grade cord compression.     Reportedly also had some lesions in the lumbar spine as well. Lumbar spine lesion biopsied, c/w leiomyoma. T11 lesion also biopsied as well which was also leiomyoma. Was offered surgery but transferred over to NS due to concern about possible underlying malignancy.     MRI T-spine w/wo shows T11 lesion that appears to have significant epidural extension with cord compression. Metastatic workup as above. Pt does not want an EGD this time- outpt f/u.     Slides from [facility] for surgical planning- path w Leiomyoma.     MRI abdomen on 4/21- A 1.8 cm right hepatic lobe hemangioma. Bilateral adrenal adenomas. Redemonstrationof T11 vertebral body mass with soft tissue invasion of the spinal canal. Two subcutaneous masses involving the lower back/upper buttock and ventral abdominal wall as detailed .    Now planned for conservative treatment- estrogen deprivation to see if it reduces disease burden causing cord compression at T11. Pt received 1 dose of Lupron a month back. To be re-dosed after d/w Gyn.

## 2023-04-28 NOTE — PROGRESS NOTE ADULT - SUBJECTIVE AND OBJECTIVE BOX
SUBJECTIVE:  Doing well, some numbness b/l foot bottom. NAD    Vital Signs Last 24 Hrs  T(C): 36.5 (28 Apr 2023 08:30), Max: 37 (27 Apr 2023 13:53)  T(F): 97.7 (28 Apr 2023 08:30), Max: 98.6 (27 Apr 2023 13:53)  HR: 84 (28 Apr 2023 08:30) (80 - 92)  BP: 127/85 (28 Apr 2023 08:30) (116/87 - 132/88)  BP(mean): --  RR: 18 (28 Apr 2023 08:30) (18 - 18)  SpO2: 100% (28 Apr 2023 08:30) (99% - 100%)    Parameters below as of 28 Apr 2023 08:30  Patient On (Oxygen Delivery Method): room air    IVF: [ X] IVL [ ] NS+K@   DIET: [X ] Regular [ ] CCD [ ] Renal [ ] Puree [ ] Dysphagia [ ] Tube Feeds:   PCA: [ ] YES [ X] NO   RODRIGUEZ: [ ] YES [X ] NO [X ] VOID Not saved  BM: [ X] YES-on 4/22    DRAINS: None    PHYSICAL EXAM:    Constitutional: No Acute Distress     Neurological: No interval change. AAOx3, Following Commands, Moving all Extremities     Motor exam:          Upper extremity                         Delt     Bicep     Tricep    HG                                                 R         5/5        5/5        5/5       5/5                                               L          5/5        5/5        5/5       5/5          Lower extremity                        HF         KF        KE       DF         PF                                                  R        5/5        5/5        5/5       5/5         5/5                                               L         5/5        5/5       5/5       5/5          5/5                                                 Sensation: [X] intact to light touch  [] decreased: Numbness b/l feet    Pulmonary: Clear to Auscultation, No rales, No rhonchi, No wheezes     Cardiovascular: S1, S2, Regular rate and rhythm     Gastrointestinal: Soft, Non-tender, Non-distended     Extremities: No calf tenderness     Incision: NA    LABS:                        12.2   7.14  )-----------( 271      ( 27 Apr 2023 06:54 )             38.0   04-27    139  |  100  |  9   ----------------------------<  84  3.8   |  27  |  0.51    Ca    9.3      27 Apr 2023 06:54    COVID-19 PCR: NotDetec (27 Apr 2023 07:16)    MRSA/MSSA PCR (04.18.23 @ 19:39)  MRSA PCR Result.: NotDetec  Staph aureus PCR Result: NotDetec    IMAGING:  < from: [facility] Duplex Lower Ext Vein Scan, Bilat (04.26.23 @ 16:08) >  No evidence of deep venous thrombosis in either lower extremity.    < end of copied text >    < from: MR Abdomen w/ IV Cont (04.21.23 @ 20:59) >  1.  A 1.8 cm right hepatic lobe hemangioma. Bilateral adrenal adenomas.  2.  Redemonstration of T11 vertebral body mass with soft tissue invasion   of the spinal canal.  3.  Two subcutaneous masses involving the lower back/upper buttock and   ventral abdominal wall as detailed above.    < end of copied text >  < from: CT Abdomen and Pelvis w/ IV Cont (04.19.23 @ 11:51) >    IMPRESSION:  *  Wall thickening of the mid esophagus, concerning for underlying mass.   Consider further evaluation with endoscopy.  *  Indeterminate lesions in the right hepatic lobe and bilateral adrenal   glands, which may be further evaluated with contrast-enhanced abdominal   MRI.  *  Subcentimeter nodules in the left lung.  *  Lytic lesion in the T11 vertebral body corresponding with lesion   described by clinical history.    < end of copied text >  < from: CT Lumbar Spine No Cont (04.19.23 @ 11:50) >  Markedly coarsened osseous texture of the entire T11 vertebral body that   extends into the bilateral pedicles. There is soft tissue filling the   left neural foramen at T11/T12 and to a lesser degree within the left   T10/T11 neural foramen, unknown if this is part of the same bony process   versus disc herniations, limited by CT technique. MRI may be helpful for   further evaluation as clinically indicated.    No additional suspicious lesions of the cervical, thoracic, lumbar spine.    < end of copied text >    MEDICATIONS  (STANDING):  enoxaparin Injectable 40 milliGRAM(s) SubCutaneous <User Schedule>  leuprolide depot Injectable (LUPRON-DEPOT) 3.75 milliGRAM(s) IntraMuscular once  multivitamin 1 Tablet(s) Oral daily  NIFEdipine XL 30 milliGRAM(s) Oral daily  pantoprazole    Tablet 40 milliGRAM(s) Oral before breakfast  polyethylene glycol 3350 17 Gram(s) Oral two times a day  senna 2 Tablet(s) Oral at bedtime    MEDICATIONS  (PRN):  acetaminophen     Tablet .. 650 milliGRAM(s) Oral every 6 hours PRN Temp greater or equal to 38C (100.4F), Mild Pain (1 - 3)  albuterol    90 MICROgram(s) HFA Inhaler 2 Puff(s) Inhalation every 6 hours PRN for shortness of breath and/or wheezing  bisacodyl 5 milliGRAM(s) Oral daily PRN Constipation  methocarbamol 500 milliGRAM(s) Oral every 8 hours PRN Muscle Spasm

## 2023-04-28 NOTE — PROGRESS NOTE ADULT - ASSESSMENT
42F Hx HTN, morbid obesity s/p gastric sleeve, hysterectomy, LBP since 08/2022 w/progressive BLE radic, 02/2023 the patient began having difficulty ambulating 2/2 pain, was started on steroids and sent to rehab->worsening pain, re-scanned w/new T11 lesion s/p biopsy- xfer from [facility] for T11 lesion. OSH path report c/w leimyoma. MR showing svr stenosis @ level of T11 w/enhancing lesion with a high fat content.     Metastatic work up with wall thickening concern for esophageal mass, right hepatic lobe lesion measures 1.4 cm &  Bilateral adrenal nodules measuring 1.8 cm on the left and 1.1 cm on the right.     Pt currently in bed, c/o LE weakness, denies pain.  42F Hx HTN, morbid obesity s/p gastric sleeve, hysterectomy, LBP since 08/2022 w/progressive BLE radic, 02/2023 the patient began having difficulty ambulating 2/2 pain, was started on steroids and sent to rehab->worsening pain, re-scanned w/new T11 lesion s/p biopsy- xfer from [facility] for T11 lesion. OSH path report c/w leimyoma.     Pt currently in bed, c/o LE weakness, denies pain.

## 2023-04-28 NOTE — PROGRESS NOTE ADULT - PROBLEM SELECTOR PLAN 2
Potential plan for surgery. Pt has low cardiac risk predictors and is medically optimized.
Potential plan for surgery. Pt has low cardiac risk predictors and is medically optimized.
Potential plan for surgery. Pt has low cardiac risk predictors and is medically optimized.   F/u baseline EKG.
Potential plan for surgery. Pt has low cardiac risk predictors and is medically optimized.

## 2023-04-29 ENCOUNTER — TRANSCRIPTION ENCOUNTER (OUTPATIENT)
Age: 43
End: 2023-04-29

## 2023-04-29 VITALS
RESPIRATION RATE: 18 BRPM | DIASTOLIC BLOOD PRESSURE: 72 MMHG | SYSTOLIC BLOOD PRESSURE: 110 MMHG | TEMPERATURE: 98 F | HEART RATE: 97 BPM | OXYGEN SATURATION: 98 %

## 2023-04-29 PROCEDURE — 85730 THROMBOPLASTIN TIME PARTIAL: CPT

## 2023-04-29 PROCEDURE — 84436 ASSAY OF TOTAL THYROXINE: CPT

## 2023-04-29 PROCEDURE — 83036 HEMOGLOBIN GLYCOSYLATED A1C: CPT

## 2023-04-29 PROCEDURE — 86901 BLOOD TYPING SEROLOGIC RH(D): CPT

## 2023-04-29 PROCEDURE — 36415 COLL VENOUS BLD VENIPUNCTURE: CPT

## 2023-04-29 PROCEDURE — 85610 PROTHROMBIN TIME: CPT

## 2023-04-29 PROCEDURE — 97161 PT EVAL LOW COMPLEX 20 MIN: CPT

## 2023-04-29 PROCEDURE — 80048 BASIC METABOLIC PNL TOTAL CA: CPT

## 2023-04-29 PROCEDURE — 84702 CHORIONIC GONADOTROPIN TEST: CPT

## 2023-04-29 PROCEDURE — 93970 EXTREMITY STUDY: CPT

## 2023-04-29 PROCEDURE — 74182 MRI ABDOMEN W/CONTRAST: CPT

## 2023-04-29 PROCEDURE — 72125 CT NECK SPINE W/O DYE: CPT

## 2023-04-29 PROCEDURE — 72128 CT CHEST SPINE W/O DYE: CPT

## 2023-04-29 PROCEDURE — 85027 COMPLETE CBC AUTOMATED: CPT

## 2023-04-29 PROCEDURE — 72131 CT LUMBAR SPINE W/O DYE: CPT

## 2023-04-29 PROCEDURE — 83735 ASSAY OF MAGNESIUM: CPT

## 2023-04-29 PROCEDURE — 87635 SARS-COV-2 COVID-19 AMP PRB: CPT

## 2023-04-29 PROCEDURE — 80053 COMPREHEN METABOLIC PANEL: CPT

## 2023-04-29 PROCEDURE — 85025 COMPLETE CBC W/AUTO DIFF WBC: CPT

## 2023-04-29 PROCEDURE — 87640 STAPH A DNA AMP PROBE: CPT

## 2023-04-29 PROCEDURE — 97110 THERAPEUTIC EXERCISES: CPT

## 2023-04-29 PROCEDURE — 97116 GAIT TRAINING THERAPY: CPT

## 2023-04-29 PROCEDURE — 93005 ELECTROCARDIOGRAM TRACING: CPT

## 2023-04-29 PROCEDURE — 84100 ASSAY OF PHOSPHORUS: CPT

## 2023-04-29 PROCEDURE — 71260 CT THORAX DX C+: CPT

## 2023-04-29 PROCEDURE — 99233 SBSQ HOSP IP/OBS HIGH 50: CPT

## 2023-04-29 PROCEDURE — 86900 BLOOD TYPING SEROLOGIC ABO: CPT

## 2023-04-29 PROCEDURE — 86850 RBC ANTIBODY SCREEN: CPT

## 2023-04-29 PROCEDURE — 87641 MR-STAPH DNA AMP PROBE: CPT

## 2023-04-29 PROCEDURE — 74177 CT ABD & PELVIS W/CONTRAST: CPT

## 2023-04-29 RX ORDER — ACETAMINOPHEN 500 MG
2 TABLET ORAL
Qty: 0 | Refills: 0 | DISCHARGE
Start: 2023-04-29

## 2023-04-29 RX ORDER — METHOCARBAMOL 500 MG/1
1 TABLET, FILM COATED ORAL
Qty: 21 | Refills: 0
Start: 2023-04-29 | End: 2023-05-05

## 2023-04-29 RX ADMIN — Medication 650 MILLIGRAM(S): at 04:15

## 2023-04-29 RX ADMIN — Medication 650 MILLIGRAM(S): at 03:25

## 2023-04-29 RX ADMIN — Medication 650 MILLIGRAM(S): at 09:10

## 2023-04-29 RX ADMIN — PANTOPRAZOLE SODIUM 40 MILLIGRAM(S): 20 TABLET, DELAYED RELEASE ORAL at 05:02

## 2023-04-29 RX ADMIN — Medication 650 MILLIGRAM(S): at 10:00

## 2023-04-29 RX ADMIN — Medication 1 TABLET(S): at 14:01

## 2023-04-29 NOTE — DISCHARGE NOTE PROVIDER - HOSPITAL COURSE
Patient was admitted to floor. Patient was monitored. Patient had mri of spine that showed a T11 spinal lesion with cord compression without signal change. Patient was decadron trial for symptom improvement. Patient had ct of the chest abdomen and pelvis that showed esophageal thickening, liver lobe lesion and bilateral adrenal lesion. Patient was seen by gastroenterology and was recommended for EGD. Patient refused EGD at this time. Patient was seen by oncology and awaited path report from [facility]. Path report came back as leiomyoma. Patient was seen by gyn onc and was recommended for lupron injection. Patient received a lupron injection on 4/28. Patient was recommended for outpatient follow up with GYN. Patient was seen by physical therapy and was recommended for outpatient physical therapy. Patient had no other complications. Patient was discharged home with follow up instructions.

## 2023-04-29 NOTE — DISCHARGE NOTE PROVIDER - NPI NUMBER (FOR SYSADMIN USE ONLY) :
[[id number]],[[id number]] [[id number]],[[id number]] Communicate Risk of Fall with Harm to all staff/Reinforce activity limits and safety measures with patient and family/Tailored Fall Risk Interventions/Visual Cue: Yellow wristband and red socks/Bed in lowest position, wheels locked, appropriate side rails in place/Call bell, personal items and telephone in reach/Instruct patient to call for assistance before getting out of bed or chair/Non-slip footwear when patient is out of bed/[facility] to call system/Physically safe environment - no spills, clutter or unnecessary equipment/Purposeful Proactive Rounding/Room/bathroom lighting operational, light cord in reach

## 2023-04-29 NOTE — PROGRESS NOTE ADULT - ASSESSMENT
HPI:  42F Hx HTN, obesity s/p gastric sleeve, hysterectomy, LBP since 08/2022 w/progressive BLE radic, 02/2023 the patient began having difficulty ambulating 2/2 pain, was started on steroids and sent to rehab->worsening pain, re-scanned w/new T11 lesion s/p biopsy  xfer from [facility] for T11 lesion. OSH path report c/w leimyoma. MR showing svr stenosis @ level of T11 w/enhancing lesion with a high fat content. CT CAP was corrupted.  (18 Apr 2023 17:32)    PROCEDURE:  none   s/p T11 lesion biospy done at outside facility prior to transfer to [facility].     PLAN:  1 No acute neurosurgical intervention   2 path is Leiomyoma   3 gyn onc following   4 s/p Lupron injection on 4/28   5 outpatient gyn-onc follow up for further treatment.   6 muscle relaxant as needed   7 room air   8 blood pressure stable   9 regular diet   10 stool softeners   11 afebrile   12 iv lock   13 dvt ppx sql and scds   14 will need egd as outpatient for esophageal thickening   15 gi follow up as outpatient for liver and adrenal lesions   16 SD home today     -will discuss with Dr. Jaclyn shultz [phone]

## 2023-04-29 NOTE — PROGRESS NOTE ADULT - PROVIDER SPECIALTY LIST ADULT
Heme/Onc
Neurosurgery
Hospitalist
Neurosurgery
Hospitalist

## 2023-04-29 NOTE — DISCHARGE NOTE PROVIDER - REASON FOR ADMISSION
T11 bony tumor. Patient presented with low back pain and bilateral lower extremity paresthesia . Patient was transferred from [facility].  T11 bony tumor

## 2023-04-29 NOTE — DISCHARGE NOTE PROVIDER - NSDCCPCAREPLAN_GEN_ALL_CORE_FT
PRINCIPAL DISCHARGE DIAGNOSIS  Diagnosis: Neoplasm of vertebral column  Assessment and Plan of Treatment: T11 lesion -confimed from biopsy as Leiomyoma   Gyn oncology saw patient -Lupron injection done on 4/28   will need outpatient Gyn follow up for further treatment.      SECONDARY DISCHARGE DIAGNOSES  Diagnosis: Hypertension  Assessment and Plan of Treatment: continue on oral home blood pressure medication

## 2023-04-29 NOTE — DISCHARGE NOTE PROVIDER - CARE PROVIDER_API CALL
Rene Anaya)  Neurosurgery  [address], [address]  [place] [address]  Phone: [phone]  Fax: [phone]  Scheduled Appointment: 05/08/2023 12:00 PM    Pito Euceda)  Gynecologic Oncology; Obstetrics and Gynecology  [address] [place] [address]  Phone: [phone]  Fax: [phone]  Follow Up Time: 1 week   Rene Anaya)  Neurosurgery  [address], [address]  [place] [address]  Phone: [phone]  Fax: [phone]  Scheduled Appointment: 05/08/2023 12:00 PM    Pito Euceda)  Gynecologic Oncology; Obstetrics and Gynecology  [address] [place] [address]  Phone: [phone]  Fax: [phone]  Follow Up Time: 1 week    Dharmesh De La Fuente)  Gastroenterology; Internal Medicine  [address] [place] [address]  Phone: [phone]  Fax: [phone]  Follow Up Time: 2 weeks

## 2023-04-29 NOTE — DISCHARGE NOTE PROVIDER - NSDCFUSCHEDAPPT_GEN_ALL_CORE_FT
Farzana Anaya  API Healthcare Physician [facility]  NEUROSURG [address] [place] [address]  Scheduled Appointment: 05/08/2023

## 2023-04-29 NOTE — PROGRESS NOTE ADULT - PROBLEM SELECTOR PROBLEM 1
Neoplasm of vertebral column
Prophylactic measure
Neoplasm of vertebral column

## 2023-04-29 NOTE — DISCHARGE NOTE PROVIDER - NSDCMRMEDTOKEN_GEN_ALL_CORE_FT
acetaminophen 325 mg oral tablet: 2 tab(s) orally every 6 hours as needed for  Mild Pain (1 - 3)  Albuterol (Eqv-Proventil HFA) 90 mcg/inh inhalation aerosol: 2 puff(s) inhaled every 6 hours as needed for  shortness of breath and/or wheezing  methocarbamol 500 mg oral tablet: 1 tab(s) orally every 8 hours as needed for Muscle Spasm MDD: 3  NIFEdipine 60 mg oral tablet, extended release: 1 tab(s) orally once a day  Outpatient physical therapy: 3 times a week for 4 weeks   dx - patient has T11 lesion confirmed as leiomyoma  -no surgical intervention needed.

## 2023-04-29 NOTE — PROGRESS NOTE ADULT - SUBJECTIVE AND OBJECTIVE BOX
SUBJECTIVE:   Patient was seen and evaluated at bedside. Patient is resting in bed and is in no new acute distress.   OVERNIGHT EVENTS:   none   Vital Signs Last 24 Hrs  T(C): 36.8 (29 Apr 2023 12:18), Max: 37.3 (28 Apr 2023 15:42)  T(F): 98.2 (29 Apr 2023 12:18), Max: 99.1 (28 Apr 2023 15:42)  HR: 90 (29 Apr 2023 12:18) (80 - 90)  BP: 129/96 (29 Apr 2023 12:18) (110/75 - 135/83)  BP(mean): --  RR: 18 (29 Apr 2023 12:18) (18 - 18)  SpO2: 97% (29 Apr 2023 12:18) (97% - 100%)    Parameters below as of 29 Apr 2023 12:18  Patient On (Oxygen Delivery Method): room air        PHYSICAL EXAM:    General: No Acute Distress     Neurological: Awake, alert oriented to person, place and time, Following Commands, PERRL, EOMI, Face Symmetrical, Speech Fluent, Moving all extremities, Muscle Strength normal in all four extremities, No Drift, Sensation to Light Touch Intact    Pulmonary: Clear to Auscultation, No Rales, No Rhonchi, No Wheezes     Cardiovascular: S1, S2, Regular Rate and Rhythm     Gastrointestinal: Soft, Nontender, Nondistended     Incision:     LABS:             04-28 @ 07:01  -  04-29 @ 07:00  --------------------------------------------------------  IN: 1005 mL / OUT: 0 mL / NET: 1005 mL    04-29 @ 07:01  -  04-29 @ 13:23  --------------------------------------------------------  IN: 440 mL / OUT: 0 mL / NET: 440 mL      DRAINS:     MEDICATIONS:  Antibiotics:    Neuro:  acetaminophen     Tablet .. 650 milliGRAM(s) Oral every 6 hours PRN Temp greater or equal to 38C (100.4F), Mild Pain (1 - 3)  methocarbamol 500 milliGRAM(s) Oral every 8 hours PRN Muscle Spasm    Cardiac:  NIFEdipine XL 30 milliGRAM(s) Oral daily    Pulm:  albuterol    90 MICROgram(s) HFA Inhaler 2 Puff(s) Inhalation every 6 hours PRN for shortness of breath and/or wheezing    GI/:  bisacodyl 5 milliGRAM(s) Oral daily PRN Constipation  pantoprazole    Tablet 40 milliGRAM(s) Oral before breakfast  polyethylene glycol 3350 17 Gram(s) Oral two times a day  senna 2 Tablet(s) Oral at bedtime    Other:   enoxaparin Injectable 40 milliGRAM(s) SubCutaneous <User Schedule>  multivitamin 1 Tablet(s) Oral daily    DIET: [] Regular [] CCD [] Renal [] Puree [] Dysphagia [] Tube Feeds:   regular diet   IMAGING:   ACC: [id number] EXAM:  MR ABDOMEN IC   ORDERED BY: BOB ZAIDI     PROCEDURE DATE:  04/21/2023          INTERPRETATION:  CLINICAL INFORMATION:    COMPARISON: None.    CONTRAST/COMPLICATIONS:  IV Contrast: Gadavist  10 cc administered   0 cc discarded  Oral Contrast: NONE  Complications: None reported at time of study completion    PROCEDURE:        FINDINGS:  LOWER CHEST: Within normal limits.    LIVER: Enlarged. No significant steatosis. A 1.8 cm T2 hyperintense right   hepatic lobe lesion with enhancement characteristics typical of a   hemangioma.  BILE DUCTS: Normal caliber.  GALLBLADDER: Cholelithiasis.  SPLEEN: Within normal limits.  PANCREAS: Within normal limits.  ADRENALS: The bilateral adrenal nodules contains microscopic fat and   therefore consistent with adrenal adenomas.  KIDNEYS/URETERS: Within normal limits.    VISUALIZED PORTIONS:  BOWEL: Small hiatal hernia. Status post sleeve gastrectomy.  PERITONEUM: No ascites.  VESSELS: Within normal limits.  RETROPERITONEUM/LYMPH NODES: No lymphadenopathy.    ABDOMINAL WALL: Postsurgical change. A 2.8 cm round, enhancing   subcutaneous mass of the lower back/upper buttock just to the right of   midline (at the level of S1), only within the field-of-view on coronal   postcontrast sequences (27, 79). Additional partially imaged 1 cm   enhancing subcutaneous nodule of the midline upper ventral abdominal wall   approximately 1 cm from the skin surface (23, 75).  BONES: T11 vertebral body mass with extraosseous soft tissue again noted.   The mass results in spinal canal narrowing and mass effect on the spinal   cord at the T11 level.    IMPRESSION:  1.  A 1.8 cm right hepatic lobe hemangioma. Bilateral adrenal adenomas.  2.  Redemonstration of T11 vertebral body mass with soft tissue invasion   of the spinal canal.  3.  Two subcutaneous masses involving the lower back/upper buttock and   ventral abdominal wall as detailed above.    --- End of Report ---             JAKE THOMAS DO; Attending Radiologist  This document has been electronically signed. Apr 23 2023 12:41PM

## 2023-04-29 NOTE — DISCHARGE NOTE PROVIDER - NSDCFUADDINST_GEN_ALL_CORE_FT
med side effects  You are on robaxin.  Robaxin is a muscle relaxant  Side effects of robaxin: tiredness, drowsiness  Please follow up with OBGYN as outpatient for further treatment.    med side effects  You are on robaxin.  Robaxin is a muscle relaxant  Side effects of robaxin: tiredness, drowsiness  Please follow up with OBGYN as outpatient for further treatment.   Please follow up with Gastroenterologists as outpatient regarding esophageal thickening , liver and adrenal gland lesion.

## 2023-04-29 NOTE — PROGRESS NOTE ADULT - PROBLEM SELECTOR PROBLEM 2
Encounter for preoperative assessment
Encounter for preoperative assessment
Neoplasm of vertebral column
Encounter for preoperative assessment
Encounter for preoperative assessment

## 2023-04-29 NOTE — PROGRESS NOTE ADULT - NSPROGADDITIONALINFOA_GEN_ALL_CORE
36 minutes spent. more than 50 percent of time was spent on examining patient, reviewed labs and vitals, spoke plan with patient and Dr. Anaya, spoke discharge plan with . also spent time on preparing discharge summary and gave discharge instructions.

## 2023-04-29 NOTE — DISCHARGE NOTE PROVIDER - CARE PROVIDERS DIRECT ADDRESSES
,DirectAddress_Unknown,[email or web address] ,DirectAddress_Unknown,[email or web address],DirectAddress_Unknown

## 2023-04-29 NOTE — PROGRESS NOTE ADULT - REASON FOR ADMISSION
T11 bony tumor

## 2023-05-08 ENCOUNTER — NON-APPOINTMENT (OUTPATIENT)
Age: 43
End: 2023-05-08

## 2023-05-08 ENCOUNTER — APPOINTMENT (OUTPATIENT)
Dept: NEUROSURGERY | Facility: CLINIC | Age: 43
End: 2023-05-08
Payer: MEDICARE

## 2023-05-08 VITALS
BODY MASS INDEX: 44.73 KG/M2 | DIASTOLIC BLOOD PRESSURE: 88 MMHG | OXYGEN SATURATION: 97 % | WEIGHT: 285 LBS | HEIGHT: 67 IN | HEART RATE: 85 BPM | SYSTOLIC BLOOD PRESSURE: 133 MMHG | TEMPERATURE: 97.6 F

## 2023-05-08 DIAGNOSIS — F17.200 NICOTINE DEPENDENCE, UNSPECIFIED, UNCOMPLICATED: ICD-10-CM

## 2023-05-08 DIAGNOSIS — Z86.018 PERSONAL HISTORY OF OTHER BENIGN NEOPLASM: ICD-10-CM

## 2023-05-08 DIAGNOSIS — G95.20 UNSPECIFIED CORD COMPRESSION: ICD-10-CM

## 2023-05-08 PROCEDURE — 99213 OFFICE O/P EST LOW 20 MIN: CPT

## 2023-05-10 ENCOUNTER — NON-APPOINTMENT (OUTPATIENT)
Age: 43
End: 2023-05-10

## 2023-05-11 PROBLEM — F17.200 SMOKER, CURRENT STATUS UNKNOWN: Status: ACTIVE | Noted: 2023-05-11

## 2023-05-11 RX ORDER — ACETAMINOPHEN 325 MG/1
325 TABLET ORAL
Refills: 0 | Status: ACTIVE | COMMUNITY

## 2023-05-11 RX ORDER — METHOCARBAMOL 500 MG/1
500 TABLET, FILM COATED ORAL
Refills: 0 | Status: ACTIVE | COMMUNITY

## 2023-05-11 NOTE — REVIEW OF SYSTEMS
[As Noted in HPI] : as noted in HPI [Feeling Poorly] : feeling poorly [Leg Weakness] : leg weakness [Poor Coordination] : poor coordination [Abnormal Sensation] : an abnormal sensation [Difficulty Walking] : difficulty walking [Negative] : Integumentary [FreeTextEntry9] : Ambulates with walke

## 2023-05-11 NOTE — HISTORY OF PRESENT ILLNESS
[FreeTextEntry1] : difficulty walking [de-identified] : Ms. TAMMY BARKER is a 42 year woman presenting with a PMHx of Fibroids, HTN, Obesity who presents for comprehensive neurosurgical evaluation of Thoracic lesion. She underwent workup comprehensive workup after experiencing gait difficulty. 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 She was treated with Lupron injection on 4/28/23. Today she ambulates with walker and demonstrate weakness in legs. She denies any bowel and bladder difficulty. \par \par Hospital Course: \par Discharge Date	29-Apr-2023 \par Admission Date	18-Apr-2023 12:01 \par Reason for Admission	T11 bony tumor \par \par Hospital Course	 \par Patient was admitted to floor. Patient was monitored. Patient had mri of spine \par that showed a T11 spinal lesion with cord compression without signal change. \par Patient was decadron trial for symptom improvement. Patient had ct of the chest \par abdomen and pelvis that showed esophageal thickening, liver lobe lesion and \par bilateral adrenal lesion. Patient was seen by gastroenterology and was \par recommended for EGD. Patient refused EGD at this time. Patient was seen by \par oncology and awaited path report from [facility]. Path report came back as \par leiomyoma. Patient was seen by gyn onc and was recommended for lupron \par injection. Patient received a lupron injection on 4/28. Patient was recommended \par for outpatient follow up with GYN. Patient was seen by physical therapy and was \par recommended for outpatient physical therapy. Patient had no other \par complications. Patient was discharged home with follow up instructions. \par \par \par \par \par \par \par \par \par .\par

## 2023-05-11 NOTE — HISTORY OF PRESENT ILLNESS
[FreeTextEntry1] : difficulty walking [de-identified] : Ms. TAMMY BARKER is a 42 year woman presenting with a PMHx of Fibroids, HTN, Obesity who presents for comprehensive neurosurgical evaluation of Thoracic lesion. She underwent workup comprehensive workup after experiencing gait difficulty. 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 She was treated with Lupron injection on 4/28/23. Today she ambulates with walker and demonstrate weakness in legs. She denies any bowel and bladder difficulty. \par \par Hospital Course: \par Discharge Date	29-Apr-2023 \par Admission Date	18-Apr-2023 12:01 \par Reason for Admission	T11 bony tumor \par \par Hospital Course	 \par Patient was admitted to floor. Patient was monitored. Patient had mri of spine \par that showed a T11 spinal lesion with cord compression without signal change. \par Patient was decadron trial for symptom improvement. Patient had ct of the chest \par abdomen and pelvis that showed esophageal thickening, liver lobe lesion and \par bilateral adrenal lesion. Patient was seen by gastroenterology and was \par recommended for EGD. Patient refused EGD at this time. Patient was seen by \par oncology and awaited path report from [facility]. Path report came back as \par leiomyoma. Patient was seen by gyn onc and was recommended for lupron \par injection. Patient received a lupron injection on 4/28. Patient was recommended \par for outpatient follow up with GYN. Patient was seen by physical therapy and was \par recommended for outpatient physical therapy. Patient had no other \par complications. Patient was discharged home with follow up instructions. \par \par \par \par \par \par \par \par \par .\par

## 2023-05-11 NOTE — DATA REVIEWED
[de-identified] : CT THoracic 4/19/23 IMPRESSION:\par \par Markedly coarsened osseous texture of the entire T11 vertebral body that extends into the bilateral pedicles. There is soft tissue filling the left neural foramen at T11/T12 and to a lesser degree within the left T10/T11 neural foramen, unknown if this is part of the same bony process versus disc herniations, limited by CT technique. MRI may be helpful for further evaluation as clinically indicated.\par \par No additional suspicious lesions of the cervical, thoracic, lumbar spine.

## 2023-05-11 NOTE — PHYSICAL EXAM
[General Appearance - Alert] : alert [General Appearance - In No Acute Distress] : in no acute distress [Oriented To Time, Place, And Person] : oriented to person, place, and time [Impaired Insight] : insight and judgment were intact [No Visual Abnormalities] : no visible abnormailities [Antalgic] : antalgic [Outer Ear] : the ears and nose were normal in appearance [] : no respiratory distress [Heart Rate And Rhythm] : heart rate was normal and rhythm regular [Able to toe walk] : the patient was not able to toe walk [Able to heel walk] : the patient was not able to heel walk [FreeTextEntry1] : Ambulates with walker

## 2023-05-11 NOTE — ASSESSMENT
[FreeTextEntry1] : Ms. TAMMY BARKER is presenting \par The recommendation is for the following:\par Imaging evaluation shows evidence of \par \par Plan:\par \par \par Follow up:\par \par \par \par .\par

## 2023-05-11 NOTE — DATA REVIEWED
[de-identified] : CT THoracic 4/19/23 IMPRESSION:\par \par Markedly coarsened osseous texture of the entire T11 vertebral body that extends into the bilateral pedicles. There is soft tissue filling the left neural foramen at T11/T12 and to a lesser degree within the left T10/T11 neural foramen, unknown if this is part of the same bony process versus disc herniations, limited by CT technique. MRI may be helpful for further evaluation as clinically indicated.\par \par No additional suspicious lesions of the cervical, thoracic, lumbar spine.

## 2023-06-30 ENCOUNTER — APPOINTMENT (OUTPATIENT)
Dept: GYNECOLOGIC ONCOLOGY | Facility: CLINIC | Age: 43
End: 2023-06-30

## 2023-06-30 PROBLEM — D25.9: Status: ACTIVE | Noted: 2023-05-08

## 2023-07-07 ENCOUNTER — APPOINTMENT (OUTPATIENT)
Dept: GYNECOLOGIC ONCOLOGY | Facility: CLINIC | Age: 43
End: 2023-07-07

## 2023-07-07 DIAGNOSIS — D25.9 LEIOMYOMA OF UTERUS, UNSPECIFIED: ICD-10-CM

## 2023-07-10 ENCOUNTER — NON-APPOINTMENT (OUTPATIENT)
Age: 43
End: 2023-07-10

## 2023-08-08 ENCOUNTER — APPOINTMENT (OUTPATIENT)
Dept: MRI IMAGING | Facility: IMAGING CENTER | Age: 43
End: 2023-08-08

## 2024-01-12 NOTE — DISCHARGE NOTE PROVIDER - PROVIDER TOKENS
PROVIDER:[TOKEN:[[id number]],SCHEDULEDAPPT:[05/08/2023],SCHEDULEDAPPTTIME:[12:00 PM]],PROVIDER:[TOKEN:[[id number]],FOLLOWUP:[1 week]] DISCHARGE PROVIDER:[TOKEN:[[id number]],SCHEDULEDAPPT:[05/08/2023],SCHEDULEDAPPTTIME:[12:00 PM]],PROVIDER:[TOKEN:[[id number]],FOLLOWUP:[1 week]],PROVIDER:[TOKEN:[[id number]],FOLLOWUP:[2 weeks]]

## 2024-10-05 NOTE — PHYSICAL THERAPY INITIAL EVALUATION ADULT - RANGE OF MOTION EXAMINATION, REHAB EVAL
- - -
bilateral upper extremity ROM was WFL (within functional limits)/bilateral lower extremity ROM was WFL (within functional limits)